# Patient Record
Sex: FEMALE | Race: BLACK OR AFRICAN AMERICAN | NOT HISPANIC OR LATINO | Employment: FULL TIME | URBAN - METROPOLITAN AREA
[De-identification: names, ages, dates, MRNs, and addresses within clinical notes are randomized per-mention and may not be internally consistent; named-entity substitution may affect disease eponyms.]

---

## 2023-10-06 DIAGNOSIS — B20 HUMAN IMMUNODEFICIENCY VIRUS (MULTI): Primary | ICD-10-CM

## 2023-10-06 RX ORDER — BICTEGRAVIR SODIUM, EMTRICITABINE, AND TENOFOVIR ALAFENAMIDE FUMARATE 50; 200; 25 MG/1; MG/1; MG/1
1 TABLET ORAL DAILY
Qty: 30 TABLET | Refills: 0 | Status: SHIPPED | OUTPATIENT
Start: 2023-10-06 | End: 2023-10-30 | Stop reason: SDUPTHER

## 2023-10-09 ENCOUNTER — TELEPHONE (OUTPATIENT)
Dept: IMMUNOLOGY | Facility: CLINIC | Age: 28
End: 2023-10-09
Payer: COMMERCIAL

## 2023-10-09 NOTE — TELEPHONE ENCOUNTER
Patient called for appointments with Dr. Ann and Dr. Lindquist.  RN was able to schedule patient with  Dr. Finn in November.  Explained to patient that Dr. Ann no longer works at the [facility].  Dr. Finn is our new OB/GYN who sees patients on Wednesday mornings.  Patient seemed ok with that.  Also told her that Dr. Lindquist's nurse will give her a call when she is back in the office to schedule her appointment with Dr. Lindquist.

## 2023-10-10 ENCOUNTER — APPOINTMENT (OUTPATIENT)
Dept: PRIMARY CARE | Facility: CLINIC | Age: 28
End: 2023-10-10
Payer: COMMERCIAL

## 2023-10-16 ENCOUNTER — TELEPHONE (OUTPATIENT)
Dept: IMMUNOLOGY | Facility: CLINIC | Age: 28
End: 2023-10-16
Payer: COMMERCIAL

## 2023-10-16 ENCOUNTER — DOCUMENTATION (OUTPATIENT)
Dept: IMMUNOLOGY | Facility: CLINIC | Age: 28
End: 2023-10-16
Payer: COMMERCIAL

## 2023-10-16 NOTE — TELEPHONE ENCOUNTER
RN advises that Pt having some concerns, was agreeable to a check in from this Sw. Kasey calls and leaves message for Pt introducing themself. Pt scheduled to see Dr. Lindquist on 10/27, and Kasey lets Pt know in their message that they will plan to meet with Pt at that visit. Kasey leaves their contact information if Pt would like to reach out to them before appt. Sw will follow. Kasey advises RN of same.

## 2023-10-16 NOTE — PROGRESS NOTES
Pt called back today after I left her a message regarding scheduling a visit with Dr. Lindquist.  Pt stated she was not doing well and was feeling overwhelmed with life in general.  She is also having a hard time getting off work to make her appointments and was asking about an FMLA being completed.  Visit scheduled for 10/27/23 with Dr. Lindquist and JANIYA Banuelos to reach out to pt this afternoon.  Dr. Lindquist made aware of situation.

## 2023-10-26 RX ORDER — HYDROXYZINE HYDROCHLORIDE 25 MG/1
25 TABLET, FILM COATED ORAL AS NEEDED
COMMUNITY
Start: 2023-04-14 | End: 2023-10-27 | Stop reason: ALTCHOICE

## 2023-10-26 RX ORDER — BUPROPION HYDROCHLORIDE 300 MG/1
300 TABLET ORAL DAILY
COMMUNITY
End: 2023-11-10 | Stop reason: SDUPTHER

## 2023-10-26 RX ORDER — IBUPROFEN 800 MG/1
TABLET ORAL
COMMUNITY

## 2023-10-26 RX ORDER — DROSPIRENONE 4 MG/1
1 TABLET, FILM COATED ORAL DAILY
COMMUNITY
End: 2023-11-15 | Stop reason: SDUPTHER

## 2023-10-26 RX ORDER — BISMUTH SUBSALICYLATE 262 MG
TABLET,CHEWABLE ORAL
COMMUNITY
Start: 2021-11-04 | End: 2023-10-27 | Stop reason: ALTCHOICE

## 2023-10-27 ENCOUNTER — OFFICE VISIT (OUTPATIENT)
Dept: IMMUNOLOGY | Facility: CLINIC | Age: 28
End: 2023-10-27
Payer: COMMERCIAL

## 2023-10-27 ENCOUNTER — DOCUMENTATION (OUTPATIENT)
Dept: IMMUNOLOGY | Facility: CLINIC | Age: 28
End: 2023-10-27

## 2023-10-27 VITALS
SYSTOLIC BLOOD PRESSURE: 127 MMHG | BODY MASS INDEX: 31.83 KG/M2 | WEIGHT: 210 LBS | OXYGEN SATURATION: 97 % | HEART RATE: 97 BPM | RESPIRATION RATE: 16 BRPM | HEIGHT: 68 IN | DIASTOLIC BLOOD PRESSURE: 87 MMHG | TEMPERATURE: 97 F

## 2023-10-27 DIAGNOSIS — B20 CURRENTLY ASYMPTOMATIC HIV INFECTION, WITH HISTORY OF HIV-RELATED ILLNESS (MULTI): ICD-10-CM

## 2023-10-27 DIAGNOSIS — F41.9 ANXIETY: ICD-10-CM

## 2023-10-27 DIAGNOSIS — F33.1 MODERATE EPISODE OF RECURRENT MAJOR DEPRESSIVE DISORDER (MULTI): Primary | ICD-10-CM

## 2023-10-27 DIAGNOSIS — Z02.89 ENCOUNTER FOR COMPLETION OF FORM WITH PATIENT: ICD-10-CM

## 2023-10-27 DIAGNOSIS — J45.909 ASTHMA, UNSPECIFIED ASTHMA SEVERITY, UNSPECIFIED WHETHER COMPLICATED, UNSPECIFIED WHETHER PERSISTENT (HHS-HCC): ICD-10-CM

## 2023-10-27 PROBLEM — A53.9 SYPHILIS: Status: RESOLVED | Noted: 2023-10-27 | Resolved: 2023-10-27

## 2023-10-27 PROBLEM — Z91.410 HISTORY OF DOMESTIC PHYSICAL ABUSE IN ADULT: Status: ACTIVE | Noted: 2023-10-27

## 2023-10-27 PROBLEM — O03.9 ABORTION (HHS-HCC): Status: ACTIVE | Noted: 2023-10-27

## 2023-10-27 PROBLEM — Z79.899 HIGH RISK MEDICATION USE: Status: ACTIVE | Noted: 2023-10-27

## 2023-10-27 PROBLEM — Z21 HIV INFECTION: Status: ACTIVE | Noted: 2023-10-27

## 2023-10-27 PROBLEM — F32.9 MAJOR DEPRESSION: Status: ACTIVE | Noted: 2023-10-27

## 2023-10-27 PROBLEM — A59.9 TRICHOMONAS INFECTION: Status: ACTIVE | Noted: 2023-10-27

## 2023-10-27 PROBLEM — O03.9 ABORTION (HHS-HCC): Status: RESOLVED | Noted: 2023-10-27 | Resolved: 2023-10-27

## 2023-10-27 PROBLEM — N92.1 BREAKTHROUGH BLEEDING ON BIRTH CONTROL PILLS: Status: ACTIVE | Noted: 2023-10-27

## 2023-10-27 PROBLEM — B59: Status: ACTIVE | Noted: 2023-10-27

## 2023-10-27 PROBLEM — R00.0 TACHYCARDIA: Status: ACTIVE | Noted: 2023-10-27

## 2023-10-27 PROBLEM — R30.0 DYSURIA: Status: RESOLVED | Noted: 2023-10-27 | Resolved: 2023-10-27

## 2023-10-27 PROBLEM — A53.9 SYPHILIS: Status: ACTIVE | Noted: 2023-10-27

## 2023-10-27 PROBLEM — R30.0 DYSURIA: Status: ACTIVE | Noted: 2023-10-27

## 2023-10-27 PROBLEM — I31.39 PERICARDIAL EFFUSION (HHS-HCC): Status: ACTIVE | Noted: 2023-10-27

## 2023-10-27 PROBLEM — A59.9 TRICHOMONAS INFECTION: Status: RESOLVED | Noted: 2023-10-27 | Resolved: 2023-10-27

## 2023-10-27 PROBLEM — F32.A CHRONIC DEPRESSION: Status: ACTIVE | Noted: 2023-10-27

## 2023-10-27 LAB
ALBUMIN SERPL BCP-MCNC: 4.3 G/DL (ref 3.4–5)
ALP SERPL-CCNC: 78 U/L (ref 33–110)
ALT SERPL W P-5'-P-CCNC: 15 U/L (ref 7–45)
ANION GAP SERPL CALC-SCNC: 11 MMOL/L (ref 10–20)
AST SERPL W P-5'-P-CCNC: 18 U/L (ref 9–39)
BASOPHILS # BLD AUTO: 0.07 X10*3/UL (ref 0–0.1)
BASOPHILS NFR BLD AUTO: 1 %
BILIRUB SERPL-MCNC: 0.3 MG/DL (ref 0–1.2)
BUN SERPL-MCNC: 16 MG/DL (ref 6–23)
CALCIUM SERPL-MCNC: 9.1 MG/DL (ref 8.6–10.6)
CHLORIDE SERPL-SCNC: 108 MMOL/L (ref 98–107)
CHOLEST SERPL-MCNC: 171 MG/DL (ref 0–199)
CHOLESTEROL/HDL RATIO: 2.9
CO2 SERPL-SCNC: 31 MMOL/L (ref 21–32)
CREAT SERPL-MCNC: 0.92 MG/DL (ref 0.5–1.05)
EOSINOPHIL # BLD AUTO: 0.22 X10*3/UL (ref 0–0.7)
EOSINOPHIL NFR BLD AUTO: 3.1 %
ERYTHROCYTE [DISTWIDTH] IN BLOOD BY AUTOMATED COUNT: 13.5 % (ref 11.5–14.5)
GFR SERPL CREATININE-BSD FRML MDRD: 87 ML/MIN/1.73M*2
GLUCOSE SERPL-MCNC: 71 MG/DL (ref 74–99)
HCT VFR BLD AUTO: 41.1 % (ref 36–46)
HDLC SERPL-MCNC: 58 MG/DL
HGB BLD-MCNC: 12.8 G/DL (ref 12–16)
IMM GRANULOCYTES # BLD AUTO: 0.02 X10*3/UL (ref 0–0.7)
IMM GRANULOCYTES NFR BLD AUTO: 0.3 % (ref 0–0.9)
LDLC SERPL CALC-MCNC: 97 MG/DL
LYMPHOCYTES # BLD AUTO: 3.26 X10*3/UL (ref 1.2–4.8)
LYMPHOCYTES NFR BLD AUTO: 45.7 %
MCH RBC QN AUTO: 27.9 PG (ref 26–34)
MCHC RBC AUTO-ENTMCNC: 31.1 G/DL (ref 32–36)
MCV RBC AUTO: 90 FL (ref 80–100)
MONOCYTES # BLD AUTO: 0.95 X10*3/UL (ref 0.1–1)
MONOCYTES NFR BLD AUTO: 13.3 %
NEUTROPHILS # BLD AUTO: 2.62 X10*3/UL (ref 1.2–7.7)
NEUTROPHILS NFR BLD AUTO: 36.6 %
NON HDL CHOLESTEROL: 113 MG/DL (ref 0–149)
NRBC BLD-RTO: 0 /100 WBCS (ref 0–0)
PLATELET # BLD AUTO: 257 X10*3/UL (ref 150–450)
PMV BLD AUTO: 10.3 FL (ref 7.5–11.5)
POTASSIUM SERPL-SCNC: 3.9 MMOL/L (ref 3.5–5.3)
PROT SERPL-MCNC: 6.7 G/DL (ref 6.4–8.2)
RBC # BLD AUTO: 4.58 X10*6/UL (ref 4–5.2)
SODIUM SERPL-SCNC: 146 MMOL/L (ref 136–145)
TRIGL SERPL-MCNC: 79 MG/DL (ref 0–149)
TSH SERPL-ACNC: 1.05 MIU/L (ref 0.44–3.98)
VLDL: 16 MG/DL (ref 0–40)
WBC # BLD AUTO: 7.1 X10*3/UL (ref 4.4–11.3)

## 2023-10-27 PROCEDURE — 85025 COMPLETE CBC W/AUTO DIFF WBC: CPT | Performed by: FAMILY MEDICINE

## 2023-10-27 PROCEDURE — 99214 OFFICE O/P EST MOD 30 MIN: CPT | Performed by: FAMILY MEDICINE

## 2023-10-27 PROCEDURE — 88185 FLOWCYTOMETRY/TC ADD-ON: CPT | Mod: TC | Performed by: FAMILY MEDICINE

## 2023-10-27 PROCEDURE — 99214 OFFICE O/P EST MOD 30 MIN: CPT | Mod: GC | Performed by: FAMILY MEDICINE

## 2023-10-27 PROCEDURE — 86318 IA INFECTIOUS AGENT ANTIBODY: CPT | Performed by: FAMILY MEDICINE

## 2023-10-27 PROCEDURE — 84443 ASSAY THYROID STIM HORMONE: CPT | Performed by: FAMILY MEDICINE

## 2023-10-27 PROCEDURE — 80061 LIPID PANEL: CPT | Performed by: FAMILY MEDICINE

## 2023-10-27 PROCEDURE — 36415 COLL VENOUS BLD VENIPUNCTURE: CPT | Performed by: FAMILY MEDICINE

## 2023-10-27 PROCEDURE — 1036F TOBACCO NON-USER: CPT | Performed by: FAMILY MEDICINE

## 2023-10-27 PROCEDURE — 87536 HIV-1 QUANT&REVRSE TRNSCRPJ: CPT | Performed by: FAMILY MEDICINE

## 2023-10-27 PROCEDURE — 80053 COMPREHEN METABOLIC PANEL: CPT | Performed by: FAMILY MEDICINE

## 2023-10-27 RX ORDER — METOPROLOL TARTRATE 25 MG/1
25 TABLET, FILM COATED ORAL 2 TIMES DAILY
COMMUNITY
Start: 2021-10-29 | End: 2023-10-27 | Stop reason: ALTCHOICE

## 2023-10-27 RX ORDER — CHOLECALCIFEROL (VITAMIN D3) 50 MCG
2.5 TABLET ORAL DAILY
COMMUNITY
Start: 2021-10-05 | End: 2023-10-27 | Stop reason: ALTCHOICE

## 2023-10-27 RX ORDER — BUTALBITAL, ACETAMINOPHEN AND CAFFEINE 50; 325; 40 MG/1; MG/1; MG/1
1 TABLET ORAL EVERY 4 HOURS PRN
COMMUNITY
Start: 2021-10-29 | End: 2023-10-27 | Stop reason: ALTCHOICE

## 2023-10-27 RX ORDER — IPRATROPIUM BROMIDE 0.5 MG/2.5ML
2.5 SOLUTION RESPIRATORY (INHALATION) EVERY 6 HOURS
COMMUNITY
Start: 2021-10-08 | End: 2023-10-27 | Stop reason: ALTCHOICE

## 2023-10-27 RX ORDER — ESCITALOPRAM OXALATE 10 MG/1
10 TABLET ORAL DAILY
Qty: 30 TABLET | Refills: 2 | Status: SHIPPED | OUTPATIENT
Start: 2023-10-27 | End: 2023-11-10 | Stop reason: WASHOUT

## 2023-10-27 RX ORDER — BUSPIRONE HYDROCHLORIDE 5 MG/1
5 TABLET ORAL EVERY 8 HOURS
COMMUNITY
Start: 2021-10-29 | End: 2023-10-27 | Stop reason: ALTCHOICE

## 2023-10-27 RX ORDER — ALBUTEROL SULFATE 0.83 MG/ML
3 SOLUTION RESPIRATORY (INHALATION) 3 TIMES DAILY PRN
COMMUNITY
Start: 2021-09-19 | End: 2023-10-27 | Stop reason: ALTCHOICE

## 2023-10-27 RX ORDER — MIDODRINE HYDROCHLORIDE 5 MG/1
5 TABLET ORAL 3 TIMES DAILY
COMMUNITY
Start: 2021-10-08 | End: 2023-10-27 | Stop reason: ALTCHOICE

## 2023-10-27 RX ORDER — FLUOXETINE HYDROCHLORIDE 20 MG/1
20 CAPSULE ORAL DAILY
COMMUNITY
Start: 2021-10-29 | End: 2023-10-27 | Stop reason: ALTCHOICE

## 2023-10-27 RX ORDER — ALBUTEROL SULFATE 90 UG/1
2 AEROSOL, METERED RESPIRATORY (INHALATION) EVERY 6 HOURS PRN
Qty: 18 G | Refills: 11 | Status: SHIPPED | OUTPATIENT
Start: 2023-10-27 | End: 2024-10-26

## 2023-10-27 RX ORDER — TEMAZEPAM 15 MG/1
15 CAPSULE ORAL NIGHTLY PRN
COMMUNITY
Start: 2022-10-28 | End: 2023-10-27 | Stop reason: ALTCHOICE

## 2023-10-27 RX ORDER — ALPRAZOLAM 1 MG/1
1 TABLET ORAL EVERY 4 HOURS PRN
COMMUNITY
Start: 2021-10-29 | End: 2023-10-27 | Stop reason: ALTCHOICE

## 2023-10-27 RX ORDER — ACETAMINOPHEN 325 MG/1
650 TABLET ORAL EVERY 4 HOURS PRN
COMMUNITY
Start: 2021-10-29

## 2023-10-27 RX ORDER — PREDNISONE 20 MG/1
20 TABLET ORAL EVERY 24 HOURS
COMMUNITY
Start: 2021-10-29 | End: 2023-10-27 | Stop reason: ALTCHOICE

## 2023-10-27 ASSESSMENT — PATIENT HEALTH QUESTIONNAIRE - PHQ9
3. TROUBLE FALLING OR STAYING ASLEEP: NEARLY EVERY DAY
SUM OF ALL RESPONSES TO PHQ QUESTIONS 1-9: 22
10. IF YOU CHECKED OFF ANY PROBLEMS, HOW DIFFICULT HAVE THESE PROBLEMS MADE IT FOR YOU TO DO YOUR WORK, TAKE CARE OF THINGS AT HOME, OR GET ALONG WITH OTHER PEOPLE: VERY DIFFICULT
SUM OF ALL RESPONSES TO PHQ9 QUESTIONS 1 & 2: 5
7. TROUBLE CONCENTRATING ON THINGS, SUCH AS READING THE NEWSPAPER OR WATCHING TELEVISION: SEVERAL DAYS
8. MOVING OR SPEAKING SO SLOWLY THAT OTHER PEOPLE COULD HAVE NOTICED. OR THE OPPOSITE, BEING SO FIGETY OR RESTLESS THAT YOU HAVE BEEN MOVING AROUND A LOT MORE THAN USUAL: SEVERAL DAYS
4. FEELING TIRED OR HAVING LITTLE ENERGY: NEARLY EVERY DAY
1. LITTLE INTEREST OR PLEASURE IN DOING THINGS: MORE THAN HALF THE DAYS
5. POOR APPETITE OR OVEREATING: NEARLY EVERY DAY
6. FEELING BAD ABOUT YOURSELF - OR THAT YOU ARE A FAILURE OR HAVE LET YOURSELF OR YOUR FAMILY DOWN: NEARLY EVERY DAY
9. THOUGHTS THAT YOU WOULD BE BETTER OFF DEAD, OR OF HURTING YOURSELF: NEARLY EVERY DAY
2. FEELING DOWN, DEPRESSED OR HOPELESS: NEARLY EVERY DAY

## 2023-10-27 ASSESSMENT — ANXIETY QUESTIONNAIRES
1. FEELING NERVOUS, ANXIOUS, OR ON EDGE: NEARLY EVERY DAY
GAD7 TOTAL SCORE: 18
2. NOT BEING ABLE TO STOP OR CONTROL WORRYING: NEARLY EVERY DAY
3. WORRYING TOO MUCH ABOUT DIFFERENT THINGS: NEARLY EVERY DAY
5. BEING SO RESTLESS THAT IT IS HARD TO SIT STILL: SEVERAL DAYS
4. TROUBLE RELAXING: NEARLY EVERY DAY
7. FEELING AFRAID AS IF SOMETHING AWFUL MIGHT HAPPEN: MORE THAN HALF THE DAYS
6. BECOMING EASILY ANNOYED OR IRRITABLE: NEARLY EVERY DAY
IF YOU CHECKED OFF ANY PROBLEMS ON THIS QUESTIONNAIRE, HOW DIFFICULT HAVE THESE PROBLEMS MADE IT FOR YOU TO DO YOUR WORK, TAKE CARE OF THINGS AT HOME, OR GET ALONG WITH OTHER PEOPLE: VERY DIFFICULT

## 2023-10-27 ASSESSMENT — PAIN SCALES - GENERAL: PAINLEVEL: 0-NO PAIN

## 2023-10-27 NOTE — ASSESSMENT & PLAN NOTE
Doing well, taking daily meds with minimal missed doses. Updated labs today, as we have none in the past year.

## 2023-10-27 NOTE — PROGRESS NOTES
Sw meets with Pt at MD appt. Pt has PHQ-9 score of 22 today, does endorse some passive suicidality. Sw provides Pt with resource information for the behavioral health urgent care at [facility]. Sw and Pt discuss safety plan - Pt denies active suicidal intent, denies access to weapons in home. MD starting Pt on Lexapro today, and Sw explains that Pt likely to see some therapeutic benefit relatively soon. Pt open to this Sw checking in with her in a few weeks. Pt endorses ART adherence, does have some access concerns as she will lose Medicaid in early 2024, but should be eligible for employer insurance. Sw advises that they are able to assist with insurance enrollment questions, also that [facility] can assist with medication access provided Pt remains financially eligible. Pt to keep Sw updated. Sw to follow.

## 2023-10-27 NOTE — PROGRESS NOTES
I saw and evaluated the patient. I personally obtained the key and critical portions of the history and physical exam or was physically present for key and critical portions performed by the resident. I reviewed the resident's documentation and discussed the patient with the resident. I agree with the resident's medical decision making as documented in the note.    HIV- well controlled, check labs and continue current regimen. Discussed various support group options    Depression and anxiety- discussed at length today and reviewed options. Ultimately we will add on lexapro, plan for counseling and fill out [facility] paperwork to allow for this. She endorses passive SI, no intent or plan. Discussed safety plan.     Plan for close follow-up    Jess Lindquist MD

## 2023-10-27 NOTE — LETTER
10/27/23    Katie Del Real MD  [address]  Pediatrics  [facility] [address]      Dear Dr. Katie Del Real MD,    I am writing to confirm that your patient, Yolis Castillo, received care in my office on 10/27/23. I have enclosed a summary of the care provided to Yolis for your reference.    Please contact me with any questions you may have regarding the visit.    Sincerely,         Jess Lindquist MD  [address]  [address]  [place] [id number]    CC: No Recipients

## 2023-10-27 NOTE — PROGRESS NOTES
"Subjective   Patient ID: Yolis is a 28 y.o. female with PMH of HIV/AIDS, depression, anxiety, and pericardial effusion who presents for HIV Positive/AIDS.    Last seen 4/2023 via telephone visit.    # HIV/AIDS  - Review of history: Diagnosed Sept 2021 when hospitalized for PCP pneumonia  - Taking Biktarvy, tolerating well, no cost issues  - sexually active with 1 partner in past 6 months, not currently sexually active, no STI exposure that she knows of  - on Slynd, no menses while on this  - no labs in past year, amenable to having them done today  - admits to feeling overwhelmed by needing to be on daily medication for the rest of her life; saddened by the daily reminder and admits that \"I don't know if I can do this for the next 30 years\"  - lives in [place], has been in the same place for a while; lives alone, daughter moves between her and her mom's house, admits that daughter has been staying with her mom more often recently because her depression/anxiety has been so overwhelming    # Depression/Anxiety  - feels exhausted all the time, but also unable to sleep due to racing thoughts and feeling panicked when laying in bed; finds it very difficult to get up in the morning, will often lay in bed crying \"for hours\"  - still taking wellbutrin daily (started about a year ago); previously had noticed significant improvement in depression symptoms, no longer feels like it's helping  - had previously tried prozac (no benefit) and zoloft (while pregnant, can't remember effectiveness)  - using marijuana daily to help with anxiety; no cigarettes; socially drinks alcohol; no other recreational drug use  - 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    Current Outpatient Medications on File Prior to Visit   Medication Sig Dispense Refill    yrwwyybfy-cylxwbbl-tinhreu ala (Biktarvy) -25 mg tablet Take 1 tablet by mouth once daily. 30 tablet 0    buPROPion XL (Wellbutrin XL) 300 mg 24 hr tablet Take 1 tablet (300 mg) by mouth once daily. as directed      ibuprofen 800 mg tablet TAKE ONE TABLET THREE TIMES A DAY FOR 5-10 DAYS PER MONTH FOR BREAKTHROUGH BLEEDING.      Slynd 4 mg (28) tablet Take 1 tablet by mouth once daily.      [DISCONTINUED] hydrOXYzine HCL (Atarax) 25 mg tablet Take 1 tablet (25 mg) by mouth if needed for anxiety.      [DISCONTINUED] multivitamin tablet Take by mouth.       No current facility-administered medications on file prior to visit.        Objective   Vitals: /87 (BP Location: Left arm, Patient Position: Sitting, BP Cuff Size: Large adult)   Pulse 97   Temp 36.1 °C (97 °F) (Skin)   Resp 16   Ht 1.727 m (5' 8\")   Wt 95.3 kg (210 lb)   SpO2 97%   BMI 31.93 kg/m²      Physical Exam  Vitals reviewed.   Constitutional:       General: She is not in acute distress.     Appearance: Normal appearance. She is not ill-appearing.   HENT:      Head: Normocephalic and atraumatic.   Eyes:      Extraocular Movements: Extraocular movements intact.      Conjunctiva/sclera: Conjunctivae normal.   Cardiovascular:      Rate and Rhythm: Normal rate and regular rhythm.      Heart sounds: No murmur heard.     No friction rub. No gallop.   Pulmonary:      Effort: Pulmonary effort is normal. No respiratory distress.      Breath sounds: Normal breath sounds. No wheezing, rhonchi or rales.   Abdominal:      General: Abdomen is flat. There is no distension.      Palpations: " Abdomen is soft.      Tenderness: There is no abdominal tenderness. There is no guarding.   Musculoskeletal:         General: No tenderness or signs of injury. Normal range of motion.      Cervical back: Normal range of motion.      Right lower leg: No edema.      Left lower leg: No edema.   Skin:     General: Skin is warm and dry.   Neurological:      General: No focal deficit present.      Mental Status: She is alert and oriented to person, place, and time.      Cranial Nerves: No cranial nerve deficit.      Motor: No weakness.      Gait: Gait normal.   Psychiatric:         Attention and Perception: Attention normal.         Mood and Affect: Mood is anxious and depressed. Affect is tearful.         Speech: Speech normal.         Behavior: Behavior normal.         Thought Content: Thought content includes suicidal (passive only, no active) ideation. Thought content does not include homicidal ideation. Thought content does not include homicidal or suicidal plan.         Cognition and Memory: Cognition and memory normal.       CD3+CD4+ Absolute   Date Value Ref Range Status   09/09/2022 0.346 (L) 0.350 - 2.740 x10E9/L Final   07/26/2022 0.466 0.350 - 2.740 x10E9/L Final   06/24/2022 0.294 (L) 0.350 - 2.740 x10E9/L Final     CD3+CD4+%   Date Value Ref Range Status   09/09/2022 18 (L) 29 - 57 % Final   07/26/2022 16 (L) 29 - 57 % Final   06/24/2022 17 (L) 29 - 57 % Final     HIV-1 RNA Viral Load   Date Value Ref Range Status   10/28/2022 NOT DETECTED COPIES/mL Final     Comment:     REF VALUE  NOT DETECTED     09/09/2022 70 (A) COPIES/mL Final     Comment:     REF VALUE  NOT DETECTED     07/26/2022 <20 (A) COPIES/mL Final     Comment:     REF VALUE  NOT DETECTED  VIRUS DETECTED BUT BELOW THE THRESHOLD OF QUANTIFICATION           PHQ9:  Over the past 2 weeks, how often have you been bothered by any of the following problems?  Little interest or pleasure in doing things: More than half the days  Feeling down, depressed,  or hopeless: Nearly every day  Trouble falling or staying asleep, or sleeping too much: Nearly every day  Feeling tired or having little energy: Nearly every day  Poor appetite or overeating: Nearly every day  Feeling bad about yourself - or that you are a failure or have let yourself or your family down: Nearly every day  Trouble concentrating on things, such as reading the newspaper or watching television: Several days  Moving or speaking so slowly that other people could have noticed? Or the opposite - being so fidgety or restless that you have been moving around a lot more than usual.: Several days  Thoughts that you would be better off dead or hurting yourself in some way: Nearly every day  Patient Health Questionnaire-9 Score: 22  Difficulty: Very difficult     GAD7:  Over the last 2 weeks, how often have you been bothered by any of the following problems?  Feeling nervous, anxious, or on edge: Nearly every day  Not being able to stop or control worrying: Nearly every day  Worrying too much about different things: Nearly every day  Trouble relaxing: Nearly every day  Being so restless that it is hard to sit still: Several days  Becoming easily annoyed or irritable: Nearly every day  Feeling afraid as if something awful might happen: More than half the days  NATI-7 Total Score: 18  Difficulty: Very difficult     Assessment/Plan   Problem List Items Addressed This Visit       Anxiety    Major depression - Primary     Significantly symptomatic. Discussed mechanism of wellbutrin, including activating effect; patient agreeable to add medication today. She's concerned about decreased sex drive with SSRI, but is amenable to try; discussed that continuing wellbutrin can help decrease this side effect, will keep current dose for now. Recommend starting Lexapro; discussed r/b/a, including 4-6 weeks to notice effect, patient agreeable. Does endorse some suicidality, but largely passive and denies any plan or self harm  behaviors, no access to lethal means, and protective factor of daughter; relatively low risk at this time. Gave therapy resources today, which patient plans to utilize. Also discussed women's support group, because a lot of this seems related to depression regarding HIV stigma. Will get TSH to r/o thyroid abnormality as etiology.         Relevant Medications    escitalopram (Lexapro) 10 mg tablet    Other Relevant Orders    TSH with reflex to Free T4 if abnormal    HIV infection (CMS/HCC)     Doing well, taking daily meds with minimal missed doses. Updated labs today, as we have none in the past year.         Relevant Orders    RPR Monitoring    Lipid Panel    HIV RNA, quantitative, PCR    CBC and Auto Differential    Comprehensive Metabolic Panel    CD4/8 Panel    C. Trachomatis / N. Gonorrhoeae, Amplified Detection    TRICH VAGINALIS, AMPLIFIED    Asthma    Relevant Medications    albuterol 90 mcg/actuation inhaler     Other Visit Diagnoses       Encounter for completion of form with patient        FMLA discussed today so that she can leave work for counseling and medical appointments, will complete and return to patient            Patient Attending Supervision: seen and discussed with attending physician (cosigner listed on this note).    RTC in 2 weeks for virtual check in, or earlier as needed.    Stella Smith MD  Family Medicine PGY-3

## 2023-10-27 NOTE — ASSESSMENT & PLAN NOTE
Significantly symptomatic. Discussed mechanism of wellbutrin, including activating effect; patient agreeable to add medication today. She's concerned about decreased sex drive with SSRI, but is amenable to try; discussed that continuing wellbutrin can help decrease this side effect, will keep current dose for now. Recommend starting Lexapro; discussed r/b/a, including 4-6 weeks to notice effect, patient agreeable. Does endorse some suicidality, but largely passive and denies any plan or self harm behaviors, no access to lethal means, and protective factor of daughter; relatively low risk at this time. Gave therapy resources today, which patient plans to utilize. Also discussed women's support group, because a lot of this seems related to depression regarding HIV stigma. Will get TSH to r/o thyroid abnormality as etiology.

## 2023-10-28 LAB
RPR SER QL: REACTIVE
RPR SER-TITR: ABNORMAL {TITER}

## 2023-10-29 LAB
CD3+CD4+ CELLS # BLD: 0.69 X10E9/L
CD3+CD4+ CELLS NFR BLD: 21 %
CD3+CD4+ CELLS/CD3+CD8+ CLL BLD: 0.39 %
CD3+CD8+ CELLS # BLD: 1.76 X10E9/L
CD3+CD8+ CELLS NFR BLD: 54 %
HIV1 RNA # PLAS NAA DL=20: ABNORMAL {COPIES}/ML
HIV1 RNA SPEC NAA+PROBE-LOG#: ABNORMAL {LOG_COPIES}/ML
LYMPHOCYTES # SPEC AUTO: 3.26 X10*3/UL

## 2023-10-30 ENCOUNTER — PATIENT MESSAGE (OUTPATIENT)
Dept: IMMUNOLOGY | Facility: CLINIC | Age: 28
End: 2023-10-30
Payer: COMMERCIAL

## 2023-10-30 DIAGNOSIS — B20 HUMAN IMMUNODEFICIENCY VIRUS (MULTI): ICD-10-CM

## 2023-10-30 RX ORDER — BICTEGRAVIR SODIUM, EMTRICITABINE, AND TENOFOVIR ALAFENAMIDE FUMARATE 50; 200; 25 MG/1; MG/1; MG/1
1 TABLET ORAL DAILY
Qty: 30 TABLET | Refills: 5 | Status: SHIPPED | OUTPATIENT
Start: 2023-10-30 | End: 2024-05-03 | Stop reason: SDUPTHER

## 2023-11-09 ENCOUNTER — OFFICE VISIT (OUTPATIENT)
Dept: PRIMARY CARE | Facility: CLINIC | Age: 28
End: 2023-11-09
Payer: COMMERCIAL

## 2023-11-09 DIAGNOSIS — F33.1 MODERATE EPISODE OF RECURRENT MAJOR DEPRESSIVE DISORDER (MULTI): ICD-10-CM

## 2023-11-09 DIAGNOSIS — J32.9 SINUSITIS, UNSPECIFIED CHRONICITY, UNSPECIFIED LOCATION: ICD-10-CM

## 2023-11-09 DIAGNOSIS — Z00.00 HEALTHCARE MAINTENANCE: ICD-10-CM

## 2023-11-09 DIAGNOSIS — F41.9 ANXIETY: Primary | ICD-10-CM

## 2023-11-09 PROCEDURE — 1036F TOBACCO NON-USER: CPT | Performed by: INTERNAL MEDICINE

## 2023-11-09 PROCEDURE — 99203 OFFICE O/P NEW LOW 30 MIN: CPT | Performed by: INTERNAL MEDICINE

## 2023-11-09 RX ORDER — PROPRANOLOL HYDROCHLORIDE 10 MG/1
10 TABLET ORAL 2 TIMES DAILY
Qty: 60 TABLET | Refills: 5 | Status: SHIPPED | OUTPATIENT
Start: 2023-11-09 | End: 2024-02-19 | Stop reason: ALTCHOICE

## 2023-11-09 RX ORDER — CETIRIZINE HYDROCHLORIDE 10 MG/1
10 TABLET ORAL DAILY
Qty: 30 TABLET | Refills: 2 | Status: SHIPPED | OUTPATIENT
Start: 2023-11-09 | End: 2023-12-04

## 2023-11-09 ASSESSMENT — PAIN SCALES - GENERAL: PAINLEVEL: 0-NO PAIN

## 2023-11-09 NOTE — PROGRESS NOTES
"New patient visit     Yolis Castillo is 28 y.o. a female   who presents for   Chief Complaint   Patient presents with    New Patient Visit     Referred by Dr. Guzman needs to establish healthcare        Problem list   Depression  Anxiety  Asthma  Tachycardia  HIV    HPI   Depression  On wellbutrin for 1 year. Prescribed escitalopram recently, but she is not taking it - does not want to be on more medications. The patient is tearful during the interview. Since her HIV diagnosis and prolonged hospitalization - including ICU stay and profound weight loss- she feels very depressed. No longer interested in hanging with friends, dating or any activities. Previously very outgoing. She feels hopeless. She does have good social support from her mother helping her with her daughter. Previously referred to access clinic, but was unable to get scheduled.     Sinus drainage  Occurs a lot at night. Never tried cetirizine.      ROS: 12 point ROS negative unless as per HPI         Tobacco Use: Low Risk  (11/9/2023)    Patient History     Smoking Tobacco Use: Never     Smokeless Tobacco Use: Never     Passive Exposure: Not on file      Allergies   Allergen Reactions    Shellfish Containing Products Anaphylaxis      Physical Exam     Visit Vitals  /73   Pulse 64   Temp 36.8 °C (98.3 °F)   Ht 1.727 m (5' 8\")   Wt 46.7 kg (103 lb)   BMI 15.66 kg/m²   Smoking Status Never   BSA 1.5 m²      Appears well, no distress  Breathing comfortably   No LE edema  Cranial nerves grossly intact     Medications     Current Outpatient Medications   Medication Instructions    acetaminophen (TYLENOL) 650 mg, oral, Every 4 hours PRN    albuterol 90 mcg/actuation inhaler 2 puffs, inhalation, Every 6 hours PRN    tqoggbatl-efsnakho-joypofu ala (Biktarvy) -25 mg tablet 1 tablet, oral, Daily    buPROPion XL (WELLBUTRIN XL) 300 mg, oral, Daily, as directed    cetirizine (ZYRTEC) 10 mg, oral, Daily    escitalopram (LEXAPRO) 5 mg, oral, Daily    " ibuprofen 800 mg tablet TAKE ONE TABLET THREE TIMES A DAY FOR 5-10 DAYS PER MONTH FOR BREAKTHROUGH BLEEDING.    propranolol (INDERAL) 10 mg, oral, 2 times daily    Slynd 4 mg (28) tablet 1 tablet, oral, Daily        Recent Labs     Lab Results   Component Value Date    CHOL 171 10/27/2023    LDLF 88 01/24/2019    HDL 58.0 10/27/2023    AST 18 10/27/2023    ALT 15 10/27/2023    BILITOT 0.3 10/27/2023        Lab Results   Component Value Date    WBC 7.1 10/27/2023    HGB 12.8 10/27/2023    HCT 41.1 10/27/2023    MCV 90 10/27/2023     10/27/2023          Chemistry    Lab Results   Component Value Date/Time     (H) 10/27/2023 1018    K 3.9 10/27/2023 1018     (H) 10/27/2023 1018    CO2 31 10/27/2023 1018    BUN 16 10/27/2023 1018    CREATININE 0.92 10/27/2023 1018    Lab Results   Component Value Date/Time    CALCIUM 9.1 10/27/2023 1018    ALKPHOS 78 10/27/2023 1018    AST 18 10/27/2023 1018    ALT 15 10/27/2023 1018    BILITOT 0.3 10/27/2023 1018             Assessment/Plan      Problem List Items Addressed This Visit       Anxiety - Primary Depression    START propranolol (Inderal) 10 mg tablet bid, prn   Continue wellbutrin 300mg   [facility]      Sinusitis, unspecified chronicity, unspecified location        START cetirizine (ZyrTEC) 10 mg tablet      HIV        Following with [facility]            RTC 3 months    Samara Miranda MD MPH  I am the attending physician.

## 2023-11-10 ENCOUNTER — TELEMEDICINE (OUTPATIENT)
Dept: IMMUNOLOGY | Facility: CLINIC | Age: 28
End: 2023-11-10
Payer: COMMERCIAL

## 2023-11-10 DIAGNOSIS — B20 CURRENTLY ASYMPTOMATIC HIV INFECTION, WITH HISTORY OF HIV-RELATED ILLNESS (MULTI): ICD-10-CM

## 2023-11-10 DIAGNOSIS — F41.9 ANXIETY: ICD-10-CM

## 2023-11-10 DIAGNOSIS — F33.1 MODERATE EPISODE OF RECURRENT MAJOR DEPRESSIVE DISORDER (MULTI): Primary | ICD-10-CM

## 2023-11-10 PROCEDURE — 99213 OFFICE O/P EST LOW 20 MIN: CPT | Performed by: FAMILY MEDICINE

## 2023-11-10 RX ORDER — BUPROPION HYDROCHLORIDE 300 MG/1
300 TABLET ORAL DAILY
Qty: 30 TABLET | Refills: 5 | Status: SHIPPED | OUTPATIENT
Start: 2023-11-10

## 2023-11-10 RX ORDER — ESCITALOPRAM OXALATE 5 MG/1
5 TABLET ORAL DAILY
Qty: 30 TABLET | Refills: 5 | Status: SHIPPED | OUTPATIENT
Start: 2023-11-10 | End: 2023-11-21 | Stop reason: SDUPTHER

## 2023-11-10 NOTE — PROGRESS NOTES
Subjective   Patient ID: Yolis Castillo is a 28 y.o. who presents for virtual visit to follow-up on depression. Transitioned to phone visit.   HPI    HIV- well controlled, see last visit for further information  - Gave resources for support groups last visit. She wants to do this and plans to get involved once [place] approved    Depression and anxiety  - See last visit for further information. We discussed counseling and starting lexapro in addition to her wellbutrin. She did not start the lexapro for fear of side effects  - Saw PCP yesterday and was recommended to try propranolol as well   - No significant changes in mood. Although does note that crying has decreased and SI is slightly improved      Review of Systems  10 point review of systems negative except as noted in HPI.    Objective     There were no vitals taken for this visit.  No acute distress. Speaking in full sentences without respiratory distress. Remainder of exam deferred      Assessment/Plan   27 yo F seen via phone visit for follow-up    Depression and anxiety  - Discussed options. Will trial a lower dose of lexapro  - Has referral to behavioral health access clinic and plans to schedule    HIV  - Well controlled, continue current regimen    RTC 4-5 months or sooner as needed

## 2023-11-15 ENCOUNTER — OFFICE VISIT (OUTPATIENT)
Dept: OBSTETRICS AND GYNECOLOGY | Facility: CLINIC | Age: 28
End: 2023-11-15
Payer: COMMERCIAL

## 2023-11-15 VITALS
RESPIRATION RATE: 16 BRPM | HEART RATE: 97 BPM | DIASTOLIC BLOOD PRESSURE: 85 MMHG | SYSTOLIC BLOOD PRESSURE: 129 MMHG | BODY MASS INDEX: 34.86 KG/M2 | OXYGEN SATURATION: 98 % | TEMPERATURE: 98.1 F | HEIGHT: 68 IN | WEIGHT: 230 LBS

## 2023-11-15 DIAGNOSIS — Z30.41 ENCOUNTER FOR SURVEILLANCE OF CONTRACEPTIVE PILLS: ICD-10-CM

## 2023-11-15 DIAGNOSIS — Z01.419 ENCOUNTER FOR GYNECOLOGICAL EXAMINATION WITHOUT ABNORMAL FINDING: Primary | ICD-10-CM

## 2023-11-15 DIAGNOSIS — Z12.4 SCREENING FOR MALIGNANT NEOPLASM OF CERVIX: ICD-10-CM

## 2023-11-15 LAB — T VAGINALIS RRNA SPEC QL NAA+PROBE: NEGATIVE

## 2023-11-15 PROCEDURE — 87661 TRICHOMONAS VAGINALIS AMPLIF: CPT | Performed by: OBSTETRICS & GYNECOLOGY

## 2023-11-15 PROCEDURE — 87800 DETECT AGNT MULT DNA DIREC: CPT | Performed by: OBSTETRICS & GYNECOLOGY

## 2023-11-15 PROCEDURE — 99395 PREV VISIT EST AGE 18-39: CPT | Performed by: OBSTETRICS & GYNECOLOGY

## 2023-11-15 PROCEDURE — 88175 CYTOPATH C/V AUTO FLUID REDO: CPT | Mod: TC,GCY | Performed by: OBSTETRICS & GYNECOLOGY

## 2023-11-15 PROCEDURE — 87661 TRICHOMONAS VAGINALIS AMPLIF: CPT | Performed by: FAMILY MEDICINE

## 2023-11-15 PROCEDURE — 1036F TOBACCO NON-USER: CPT | Performed by: OBSTETRICS & GYNECOLOGY

## 2023-11-15 PROCEDURE — 88141 CYTOPATH C/V INTERPRET: CPT | Performed by: PATHOLOGY

## 2023-11-15 RX ORDER — DROSPIRENONE 4 MG/1
1 TABLET, FILM COATED ORAL DAILY
Qty: 28 TABLET | Refills: 12 | Status: SHIPPED | OUTPATIENT
Start: 2023-11-15 | End: 2024-11-14

## 2023-11-15 ASSESSMENT — PAIN SCALES - GENERAL: PAINLEVEL: 0-NO PAIN

## 2023-11-15 NOTE — PROGRESS NOTES
Subjective   Patient ID: Yolis Castillo is a 28 y.o. female who presents for No chief complaint on file..  Annual gyn visit.   On Slynd for contraception- doing ok with it. Some mood changes, but tolerable. Occasionally misses pills.  Pap last year WNL, needs repeat.   Ok with STI testing off the Pap.   No menses on the pills.         Review of Systems   All other systems reviewed and are negative.      Objective   Physical Exam  Vitals reviewed.   Constitutional:       Appearance: Normal appearance.   HENT:      Head: Normocephalic and atraumatic.      Right Ear: External ear normal.      Left Ear: External ear normal.      Nose: Nose normal.      Mouth/Throat:      Mouth: Mucous membranes are moist.   Eyes:      Extraocular Movements: Extraocular movements intact.   Neck:      Thyroid: No thyroid mass or thyromegaly.   Cardiovascular:      Rate and Rhythm: Normal rate and regular rhythm.      Heart sounds: Normal heart sounds.   Pulmonary:      Effort: Pulmonary effort is normal.      Breath sounds: Normal breath sounds.   Chest:   Breasts:     Right: Normal.      Left: Normal.   Abdominal:      General: Abdomen is flat. Bowel sounds are normal.      Palpations: Abdomen is soft.      Tenderness: There is no abdominal tenderness. There is no right CVA tenderness or left CVA tenderness.   Genitourinary:     General: Normal vulva.      Exam position: Lithotomy position.      Labia:         Right: No lesion.         Left: No lesion.       Urethra: No prolapse, urethral swelling or urethral lesion.      Vagina: Normal.      Cervix: Normal.      Uterus: Normal.       Adnexa: Right adnexa normal and left adnexa normal.   Musculoskeletal:         General: Normal range of motion.      Right shoulder: Normal.      Left shoulder: Normal.      Cervical back: Normal, normal range of motion and neck supple.      Thoracic back: Normal.      Lumbar back: Normal.      Right hip: Normal.      Left hip: Normal.      Right upper  leg: Normal.      Left upper leg: Normal.      Right knee: Normal.      Left knee: Normal.      Right lower leg: Normal.      Left lower leg: Normal.   Lymphadenopathy:      Upper Body:      Right upper body: No axillary adenopathy.      Left upper body: No axillary adenopathy.      Lower Body: No right inguinal adenopathy. No left inguinal adenopathy.   Skin:     General: Skin is warm and dry.   Neurological:      General: No focal deficit present.      Mental Status: She is alert and oriented to person, place, and time.      Cranial Nerves: Cranial nerves 2-12 are intact.      Motor: Motor function is intact.   Psychiatric:         Attention and Perception: Attention and perception normal.         Mood and Affect: Mood and affect normal.         Behavior: Behavior normal.         Cognition and Memory: Cognition normal.         Judgment: Judgment normal.         Assessment/Plan   Problem List Items Addressed This Visit    None  Visit Diagnoses         Codes    Encounter for gynecological examination without abnormal finding    -  Primary Z01.419    Encounter for surveillance of contraceptive pills     Z30.41    Relevant Medications    Slynd 4 mg (28) tablet    Screening for malignant neoplasm of cervix     Z12.4    Relevant Orders    THINPREP PAP TEST

## 2023-11-16 LAB — T VAGINALIS RRNA SPEC QL NAA+PROBE: NEGATIVE

## 2023-11-17 DIAGNOSIS — A74.9 CHLAMYDIA: Primary | ICD-10-CM

## 2023-11-17 LAB
C TRACH RRNA SPEC QL NAA+PROBE: POSITIVE
N GONORRHOEA DNA SPEC QL PROBE+SIG AMP: NEGATIVE

## 2023-11-17 RX ORDER — DOXYCYCLINE 100 MG/1
100 CAPSULE ORAL 2 TIMES DAILY
Qty: 14 CAPSULE | Refills: 0 | Status: SHIPPED | OUTPATIENT
Start: 2023-11-17 | End: 2023-11-24

## 2023-11-20 ENCOUNTER — TELEPHONE (OUTPATIENT)
Dept: IMMUNOLOGY | Facility: CLINIC | Age: 28
End: 2023-11-20
Payer: COMMERCIAL

## 2023-11-20 NOTE — TELEPHONE ENCOUNTER
RN called patient regarding result.  Patient told RN that she picked up the flagyl prescription on Friday.  Med is making her nauseated.  Threw up med this am.  RN suggested that she try eating something before taking the medication.  Encouraged patient to call RN with any questions or concerns.

## 2023-11-21 ENCOUNTER — OFFICE VISIT (OUTPATIENT)
Dept: BEHAVIORAL HEALTH | Facility: CLINIC | Age: 28
End: 2023-11-21
Payer: COMMERCIAL

## 2023-11-21 DIAGNOSIS — F33.1 MODERATE EPISODE OF RECURRENT MAJOR DEPRESSIVE DISORDER (MULTI): ICD-10-CM

## 2023-11-21 DIAGNOSIS — F51.5 NIGHTMARES: ICD-10-CM

## 2023-11-21 DIAGNOSIS — F41.9 ANXIETY: ICD-10-CM

## 2023-11-21 PROCEDURE — 90792 PSYCH DIAG EVAL W/MED SRVCS: CPT | Performed by: STUDENT IN AN ORGANIZED HEALTH CARE EDUCATION/TRAINING PROGRAM

## 2023-11-21 PROCEDURE — 1036F TOBACCO NON-USER: CPT | Performed by: STUDENT IN AN ORGANIZED HEALTH CARE EDUCATION/TRAINING PROGRAM

## 2023-11-21 RX ORDER — TOPIRAMATE 25 MG/1
25 TABLET ORAL NIGHTLY
Qty: 30 TABLET | Refills: 3 | Status: SHIPPED | OUTPATIENT
Start: 2023-11-21 | End: 2024-02-19 | Stop reason: ALTCHOICE

## 2023-11-21 RX ORDER — ESCITALOPRAM OXALATE 5 MG/1
20 TABLET ORAL DAILY
Qty: 30 TABLET | Refills: 3 | Status: SHIPPED | OUTPATIENT
Start: 2023-11-21 | End: 2024-04-17 | Stop reason: DRUGHIGH

## 2023-11-21 RX ORDER — HYDROXYZINE HYDROCHLORIDE 10 MG/1
10 TABLET, FILM COATED ORAL EVERY 6 HOURS PRN
Qty: 30 TABLET | Refills: 3 | Status: SHIPPED | OUTPATIENT
Start: 2023-11-21 | End: 2024-02-19 | Stop reason: SDUPTHER
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
## 2023-11-21 NOTE — PATIENT INSTRUCTIONS
You were seen in the Psychiatry clinic for depression and anxiety.     We think your symptoms are not well controlled and are quite debilitating, we would like to make some changes to your medications:    - Increase Lexapro to 20mg daily  - Start Topiramate 25mg at night to help with nightmares and sleep, after 1 week you can increase this to 50mg at night  - Stop Propranolol  - Start taking Hydroxyzine 10mg as needed when you're having uncontrolled anxiety/panic attacks  - We encourage you to schedule a visit with your therapist about these ongoing issues.    We will schedule follow-up in 4 week on December 19th.

## 2023-11-29 LAB
CYTOLOGY CMNT CVX/VAG CYTO-IMP: NORMAL
LAB AP CONTRACEPTIVE HISTORY: NORMAL
LAB AP HPV GENOTYPE QUESTION: NO
LAB AP HPV HR: NORMAL
LAB AP PAP ADDITIONAL TESTS: NORMAL
LABORATORY COMMENT REPORT: NORMAL
LMP START DATE: NORMAL
PATH REPORT.TOTAL CANCER: NORMAL

## 2023-12-04 DIAGNOSIS — J32.9 SINUSITIS, UNSPECIFIED CHRONICITY, UNSPECIFIED LOCATION: ICD-10-CM

## 2023-12-04 RX ORDER — CETIRIZINE HYDROCHLORIDE 10 MG/1
10 TABLET ORAL DAILY
Qty: 90 TABLET | Refills: 1 | Status: SHIPPED | OUTPATIENT
Start: 2023-12-04

## 2023-12-19 ENCOUNTER — APPOINTMENT (OUTPATIENT)
Dept: BEHAVIORAL HEALTH | Facility: CLINIC | Age: 28
End: 2023-12-19
Payer: COMMERCIAL

## 2023-12-25 NOTE — PROGRESS NOTES
Outpatient Psychiatry    Subjective   Yolis Castillo, a 28 y.o. female with PPH of MDD and anxiety, and PMH HIV presenting to [facility] for follow-up visit.  Patient is referred by Samara Miranda MD MPH .      HPI:  Per chart:  Patient was seen by [facility] on 11/21/23 for initial evaluation. Per review of documentation, it appears that patient's hospitalization in November 2021 for severe pneumonia (found to be HIV pos) has been a major triggering factor for her anxiety and depressive symptoms. Regarding medications, patient has trialed Wellbutrin and Propranolol with no benefit. She had subsequently been initiated on Lexapro. Recommendation at appointment on 11/21 was to increase Lexapro to 20mg PO daily and to start Topamax 25mg PO nightly to help with nightmares and sleep (with planto incrase to 50mg at night. Recommended to discontinue propranolol, and to start taking Atarax 10mg PRN anxiety attacks.    Upon assessment:      Psychiatric Review Of Systems:  Depressive Symptoms: {Depression:77581568}  Manic Symptoms: {Amy:52600775}  Anxiety Symptoms: {Anxiety:93333633}  Psychotic Symptoms: {Psychosis:71752251}  Other Symptoms:    Current Medications:    Current Outpatient Medications:     acetaminophen (Tylenol) 325 mg tablet, Take 2 tablets (650 mg) by mouth every 4 hours if needed for headaches., Disp: , Rfl:     albuterol 90 mcg/actuation inhaler, Inhale 2 puffs every 6 hours if needed for wheezing., Disp: 18 g, Rfl: 11    ouucoxleh-pcpgrxhq-tayhsrk ala (Biktarvy) -25 mg tablet, Take 1 tablet by mouth once daily., Disp: 30 tablet, Rfl: 5    buPROPion XL (Wellbutrin XL) 300 mg 24 hr tablet, Take 1 tablet (300 mg) by mouth once daily. as directed, Disp: 30 tablet, Rfl: 5    cetirizine (ZyrTEC) 10 mg tablet, TAKE 1 TABLET BY MOUTH EVERY DAY, Disp: 90 tablet, Rfl: 1    escitalopram (Lexapro) 5 mg tablet, Take 4 tablets (20 mg) by mouth once daily., Disp: 30 tablet, Rfl: 3    hydrOXYzine HCL  (Atarax) 10 mg tablet, Take 1 tablet (10 mg) by mouth every 6 hours if needed for itching., Disp: 30 tablet, Rfl: 3    ibuprofen 800 mg tablet, TAKE ONE TABLET THREE TIMES A DAY FOR 5-10 DAYS PER MONTH FOR BREAKTHROUGH BLEEDING., Disp: , Rfl:     propranolol (Inderal) 10 mg tablet, Take 1 tablet (10 mg) by mouth 2 times a day., Disp: 60 tablet, Rfl: 5    Slynd 4 mg (28) tablet, Take 1 tablet by mouth once daily., Disp: 28 tablet, Rfl: 12    topiramate (Topamax) 25 mg tablet, Take 1 tablet (25 mg) by mouth once daily at bedtime. Take 1 tablet at night for 1 week, if tolerating can then increase to 2 tablets at night, Disp: 30 tablet, Rfl: 3    Medical History:  Past Medical History:   Diagnosis Date     10/27/2023    AIDS (CMS/[facility]) 10/27/2023    Syphilis 10/27/2023    Trichomonas infection 10/27/2023       Record Review: moderate     Mental Status Exam  Appearance: ***  Attitude: Calm, cooperative, and engaged in conversation.  Behavior: Appropriate eye contact.   Motor Activity: No psychomotor agitation or retardation. No abnormal movements, tremors or tics. No evidence of extrapyramidal symptoms or tardive dyskinesia.  Speech: Regular rate, rhythm, volume. Spontaneous, no pressured speech.  Mood: ***  Affect: Euthymic, full range, mood congruent.  Thought Process: Linear, logical, and goal-directed. No loose associations or gross thought disorganization.  Thought Content: Denied current suicidal ideation or thoughts of harm to self, denied homicidal ideation or thoughts of harm to others. No delusional thinking elicited. No perseverations or obsessions identified.   Perception: Did not endorse auditory or visual hallucinations, did not appear to be responding to hallucinatory stimuli.   Cognition: Alert, oriented x3. Preserved attention span and concentration, recent and remote memory. Adequate fund of knowledge. No deficits in language.   Insight: Fair, in regards to understanding mental health  condition  Judgement: Fair      Assessment/Plan   Yolis Castillo, a 28 y.o. female with PPH of MDD and anxiety, and PMH HIV presenting to [facility] for follow-up visit.    Patient had initially been seen at [facility] on 11/21/23 for evaluation of depressive and anxiety symptoms. Recommendation at that time was to increase Lexapro to 20mg, to initiate Topamax for sleep and nightmares, to discontinue Propranolol and start Atarax 10mg PRN anxiety.     Upon follow-up assessment,     Impression:  Trauma and stressor related disorder   Depression, unspecified  Anxiety    Risk Assessment:  Risk of harm to self: {PSYCH SUICIDE RISK:09707}    Risk of harm to others: {PSYCH VIOLENCE RISK:27557}    Plan/Recommendations:  Medications: ***  Orders: ***  Follow up: PCP  Call  Psychiatry at [phone] with issues.  For [facility] residents, [employer] is a 24/7 hotline you can call for assistance [[phone]]. Please call [phone] or go to your closest Emergency Room if you feel unsafe. This includes thoughts of hurting yourself or anyone else, or having other troubles such as hearing voices, seeing visions, or having new and scary thoughts about the people around you.    Review with patient: Medication risks/benefit reviewed with the patient    Seen and discussed with attending, Dr. Cote, who agrees with above.     Kevin Fall MD

## 2023-12-26 ENCOUNTER — APPOINTMENT (OUTPATIENT)
Dept: BEHAVIORAL HEALTH | Facility: CLINIC | Age: 28
End: 2023-12-26
Payer: COMMERCIAL

## 2024-02-07 DIAGNOSIS — G43.509 PERSISTENT MIGRAINE AURA WITHOUT CEREBRAL INFARCTION AND WITHOUT STATUS MIGRAINOSUS, NOT INTRACTABLE: Primary | ICD-10-CM

## 2024-02-07 RX ORDER — BUTALBITAL, ACETAMINOPHEN AND CAFFEINE 50; 325; 40 MG/1; MG/1; MG/1
1-2 TABLET ORAL EVERY 4 HOURS PRN
Qty: 30 TABLET | Refills: 0 | Status: SHIPPED | OUTPATIENT
Start: 2024-02-07 | End: 2024-03-04

## 2024-02-07 RX ORDER — BUTALBITAL, ACETAMINOPHEN AND CAFFEINE 50; 325; 40 MG/1; MG/1; MG/1
1 TABLET ORAL EVERY 4 HOURS PRN
Qty: 60 TABLET | Refills: 0 | Status: CANCELLED | OUTPATIENT
Start: 2024-02-07 | End: 2024-03-08

## 2024-02-14 DIAGNOSIS — R30.0 DYSURIA: ICD-10-CM

## 2024-02-14 DIAGNOSIS — R39.15 URGENCY OF URINATION: ICD-10-CM

## 2024-02-14 DIAGNOSIS — B20 HIV DISEASE (MULTI): ICD-10-CM

## 2024-02-14 DIAGNOSIS — R35.0 FREQUENCY OF URINATION: ICD-10-CM

## 2024-02-14 DIAGNOSIS — Z11.3 ROUTINE SCREENING FOR STI (SEXUALLY TRANSMITTED INFECTION): ICD-10-CM

## 2024-02-14 DIAGNOSIS — B20 HUMAN IMMUNODEFICIENCY VIRUS (HIV) DISEASE (MULTI): ICD-10-CM

## 2024-02-14 NOTE — PROGRESS NOTES
"Complains of pain in right pelvis.  States that \"she feels that something is there\".  Will see Dr. Finn next week.  Will come in Friday for lab work.  "

## 2024-02-19 ENCOUNTER — TELEMEDICINE (OUTPATIENT)
Dept: PRIMARY CARE | Facility: CLINIC | Age: 29
End: 2024-02-19
Payer: COMMERCIAL

## 2024-02-19 DIAGNOSIS — F41.9 ANXIETY: ICD-10-CM

## 2024-02-19 DIAGNOSIS — H53.9 VISION CHANGES: Primary | ICD-10-CM

## 2024-02-19 PROCEDURE — 1036F TOBACCO NON-USER: CPT | Performed by: INTERNAL MEDICINE

## 2024-02-19 PROCEDURE — 99213 OFFICE O/P EST LOW 20 MIN: CPT | Performed by: INTERNAL MEDICINE

## 2024-02-19 RX ORDER — HYDROXYZINE HYDROCHLORIDE 10 MG/1
10 TABLET, FILM COATED ORAL EVERY 6 HOURS PRN
Qty: 30 TABLET | Refills: 3 | Status: SHIPPED | OUTPATIENT
Start: 2024-02-19 | End: 2024-05-17 | Stop reason: ALTCHOICE

## 2024-02-19 ASSESSMENT — ENCOUNTER SYMPTOMS
SLEEP DISTURBANCE: 1
DYSPHORIC MOOD: 1
NERVOUS/ANXIOUS: 1
DECREASED CONCENTRATION: 1
CONSTITUTIONAL NEGATIVE: 1
SHORTNESS OF BREATH: 0

## 2024-02-19 NOTE — PROGRESS NOTES
"Establish patient visit - Patient originally scheduled for video, but asked to be changed to a telephone encounter.    Yolis Castillo is 28 y.o. a female   who presents for   Chief Complaint   Patient presents with    Follow-up     Follow up visit       Problem list   Depression  Anxiety  Asthma  HIV    HPI   Depression  At the last visit, she was referred to psychiatry for her anxiety. She reports being unsatisfied with the visits because they prescribed her multiple new medications - notably hydroxyzine and Topamax. She did not try either medication because she felt that they were \"just throwing medications [at her].\" The propranolol that I started was not effective and she has been using Benadryl OTC prn anxiety symptoms and sleep. She did start taking the escitalopram that was prescribed by the [facility] so she is now on bupropion and escitalopram.  Discussed that hydroxyzine is related to benadryl and might be effective as needed. Patient also completed some sessions of psychotherapy, but was seen by a resident and I discussed how residency precepting works on a practical level. Still has some insomnia and racing thoughts; the racing thoughts have improved since the last visit - she is no longer reporting them throughout the day when she is occupied, they now mostly occur at night and sometimes interfere with sleep. Discussed OTC melatonin as well. Feels overall her mood has improved significantly since our last visit.      Review of Systems   Constitutional: Negative.    Respiratory:  Negative for shortness of breath.    Cardiovascular:  Negative for chest pain.   Psychiatric/Behavioral:  Positive for decreased concentration, dysphoric mood and sleep disturbance. Negative for suicidal ideas. The patient is nervous/anxious.         Allergies   Allergen Reactions    Shellfish Containing Products Anaphylaxis      Physical Exam   N/a - telephone visit  Medications     Current Outpatient Medications   Medication " Instructions    acetaminophen (TYLENOL) 650 mg, oral, Every 4 hours PRN    albuterol 90 mcg/actuation inhaler 2 puffs, inhalation, Every 6 hours PRN    cqjenkrrz-odfafzuq-aepispn ala (Biktarvy) -25 mg tablet 1 tablet, oral, Daily    buPROPion XL (WELLBUTRIN XL) 300 mg, oral, Daily, as directed    butalbital-acetaminophen-caff -40 mg tablet 1-2 tablets, oral, Every 4 hours PRN, Use no more than 5/day, 10/week, 30/month.    cetirizine (ZYRTEC) 10 mg, oral, Daily    escitalopram (LEXAPRO) 20 mg, oral, Daily    hydrOXYzine HCL (ATARAX) 10 mg, oral, Every 6 hours PRN    ibuprofen 800 mg tablet TAKE ONE TABLET THREE TIMES A DAY FOR 5-10 DAYS PER MONTH FOR BREAKTHROUGH BLEEDING.    Slynd 4 mg (28) tablet 1 tablet, oral, Daily        Recent Labs     Lab Results   Component Value Date    CHOL 171 10/27/2023    LDLF 88 01/24/2019    HDL 58.0 10/27/2023    AST 18 10/27/2023    ALT 15 10/27/2023    BILITOT 0.3 10/27/2023        Lab Results   Component Value Date    WBC 7.1 10/27/2023    HGB 12.8 10/27/2023    HCT 41.1 10/27/2023    MCV 90 10/27/2023     10/27/2023          Chemistry    Lab Results   Component Value Date/Time     (H) 10/27/2023 1018    K 3.9 10/27/2023 1018     (H) 10/27/2023 1018    CO2 31 10/27/2023 1018    BUN 16 10/27/2023 1018    CREATININE 0.92 10/27/2023 1018    Lab Results   Component Value Date/Time    CALCIUM 9.1 10/27/2023 1018    ALKPHOS 78 10/27/2023 1018    AST 18 10/27/2023 1018    ALT 15 10/27/2023 1018    BILITOT 0.3 10/27/2023 1018             Assessment/Plan    Diagnoses and all orders for this visit:  Vision changes  -     Referral to Ophthalmology; Future  Patient feels she has eye strain and needs a new exam   Anxiety and Depression  -    Advised to try hydrOXYzine HCL (Atarax) 10 mg tablet; Take 1 tablet (10 mg) by mouth every 6 hours if needed for anxiety.  - Continue bupropion 300 mg and escitalopram 5 mg   - Discontinue propranolol - ineffective   -  Discontinue topiramate - never used   Other orders   -     Follow Up In Primary Care - Established; Future            RTC 4 months    Samara Miranda MD MPH  I am the attending physician.

## 2024-02-23 ENCOUNTER — TELEPHONE (OUTPATIENT)
Dept: IMMUNOLOGY | Facility: CLINIC | Age: 29
End: 2024-02-23
Payer: COMMERCIAL

## 2024-02-23 NOTE — TELEPHONE ENCOUNTER
SW returned call, introduced self as her SW while MJ is out of the office.  Pt has questions about insurance and medication.  Her Medicaid will stop at the end of the month due income increase.  Reviewed options and questions to address her HR, pt felt better and more informed and verbally agreed.  Also, pt is behind bills due to having to take time off from work for MH and other health appointments.  EFA application was completed and sent to our  for approval.  Pt will send more information after she talks with HR.  RW application was done by our rep, too.  Will continue to assist and monitor as needed.

## 2024-03-02 DIAGNOSIS — G43.509 PERSISTENT MIGRAINE AURA WITHOUT CEREBRAL INFARCTION AND WITHOUT STATUS MIGRAINOSUS, NOT INTRACTABLE: ICD-10-CM

## 2024-03-04 RX ORDER — BUTALBITAL, ACETAMINOPHEN AND CAFFEINE 50; 325; 40 MG/1; MG/1; MG/1
1-2 TABLET ORAL EVERY 4 HOURS PRN
Qty: 30 TABLET | Refills: 1 | Status: SHIPPED | OUTPATIENT
Start: 2024-03-04 | End: 2024-06-01

## 2024-04-11 ENCOUNTER — DOCUMENTATION (OUTPATIENT)
Dept: IMMUNOLOGY | Facility: CLINIC | Age: 29
End: 2024-04-11
Payer: COMMERCIAL

## 2024-04-11 NOTE — PROGRESS NOTES
Pt called this morning regarding feeling overwhelmed and not doing well in general.  She is still taking her wellbutrin and escitalopram but can't really tell if it is working or not. Pt agreed to speak with her  and they will be reaching out to her later this week.

## 2024-04-16 ENCOUNTER — TELEPHONE (OUTPATIENT)
Dept: IMMUNOLOGY | Facility: CLINIC | Age: 29
End: 2024-04-16
Payer: COMMERCIAL

## 2024-04-16 NOTE — TELEPHONE ENCOUNTER
Kasey calls Pt to check in. Pt reports that she's having a difficult time lately. Pt reports lack of sleep, and endorses up to two to three days at a time with little to no sleep at all. Pt states that she works from home and has very little human interaction other than with her seven year old daughter. Pt is considering taking some time off of work for her mental health, states that she's having a lot of trouble doing her job right now. Sw and Pt discuss her antidepressants - not sure if Wellbutrin is effective, is currently only taking 5mg Lexapro/day. Sw and Pt discuss increasing Lexapro to 10mg (Sw previously discussed this with MD who agreed with this plan - recommendation from  psychiatry a few months back was for Pt to increase dose to 20mg but Pt did not initiate same). Pt will start with increased dose today. Pt reports that she did try to see a therapist in the past but struggled to keep appts as she couldn't use her PTO. Kasey advises that there are therapists who have evening hours - they will assist Pt in finding providers who offer same so Pt can try to establish care. Pt denies SI/HI at this time, is aware of emergency numbers.    Pt endorses some missed ART, is doing her best to keep up with regimen. Pt is scheduled to see Dr. Lindquist on 4/26, doesn't know if she can keep as scheduled. Sw encourages Pt to keep if she can - at best, see if MD could see her via [facility]. Kasey will ask RN to check in with Pt next week re: same. Kasey emails MD and RN to provide update.

## 2024-04-17 ENCOUNTER — TELEPHONE (OUTPATIENT)
Dept: PRIMARY CARE | Facility: CLINIC | Age: 29
End: 2024-04-17
Payer: COMMERCIAL

## 2024-04-17 DIAGNOSIS — F32.2 CURRENT SEVERE EPISODE OF MAJOR DEPRESSIVE DISORDER WITHOUT PSYCHOTIC FEATURES, UNSPECIFIED WHETHER RECURRENT (MULTI): Primary | ICD-10-CM

## 2024-04-17 RX ORDER — ESCITALOPRAM OXALATE 10 MG/1
10 TABLET ORAL DAILY
Qty: 30 TABLET | Refills: 5 | Status: SHIPPED | OUTPATIENT
Start: 2024-04-17 | End: 2024-04-29 | Stop reason: WASHOUT

## 2024-04-17 NOTE — TELEPHONE ENCOUNTER
Spoke to patient. Has been severely depressed. Has difficulty sleeping. Difficulty cleaning, getting out of bed. Denies SI/HI. Grandfather in hospital, this had impacted her significantly. Looking into short term disability at work, which I support as long as we can get her linked with mental health care.     She would like to see a psychiatrist for continuity. Saw behavioral health access clinic but felt overwhelmed with medication options. Has continued to take wellbutrin and lexapro 5 mg.      Plan to increase lexapro to 10 mg, follow-up next week. She will send paperwork for short term disability.

## 2024-04-26 ENCOUNTER — APPOINTMENT (OUTPATIENT)
Dept: IMMUNOLOGY | Facility: CLINIC | Age: 29
End: 2024-04-26
Payer: COMMERCIAL

## 2024-04-29 ENCOUNTER — TELEPHONE (OUTPATIENT)
Dept: IMMUNOLOGY | Facility: CLINIC | Age: 29
End: 2024-04-29
Payer: COMMERCIAL

## 2024-04-29 DIAGNOSIS — F41.9 ANXIETY: ICD-10-CM

## 2024-04-29 DIAGNOSIS — F33.1 MODERATE EPISODE OF RECURRENT MAJOR DEPRESSIVE DISORDER (MULTI): Primary | ICD-10-CM

## 2024-04-29 RX ORDER — ESCITALOPRAM OXALATE 20 MG/1
20 TABLET ORAL DAILY
Qty: 30 TABLET | Refills: 2 | Status: SHIPPED | OUTPATIENT
Start: 2024-04-29 | End: 2024-07-28

## 2024-04-29 NOTE — TELEPHONE ENCOUNTER
Spoke with patient over the phone. She is currently taking short term disability due to severe depression episode. We discussed this and filled out paperwork to support this. I recommend about 8 weeks off (through 2024)    She continues to have very poor sleep and poor appetite for the last 1.5 months. She recently increased lexapro to 20 mg. Continues to take wellbutrin. Denies SI. She recently lost her grandfather, who was like a parent to her.  was on Friday. She is in shock and grieving.     Yolis is agreeable to see therapy and psychiatry. Social work will reach out to her to help with resources. We will plan a follow-up visit within 2 weeks.

## 2024-04-29 NOTE — TELEPHONE ENCOUNTER
Kasey calls Pt to follow up after her conversation with MD this morning. Pt also checked in with RN advising she was ready to speak to Sw and asked that they call her. Kasey has some counseling and psychiatry resources for Pt that have should have some evening hours - private and health center based.     [facility] OFF[facility]  (at [facility])  [id number] [place] [id number] [phone]  8:30am - 5:00pm M, T, Th, F  8:30am - 7:30pm W    [facility]  [address]  [place] [address] [phone]    [facility]  [phone]    [facility]  [address] [place] [address] [phone]?

## 2024-05-01 ENCOUNTER — DOCUMENTATION (OUTPATIENT)
Dept: IMMUNOLOGY | Facility: CLINIC | Age: 29
End: 2024-05-01
Payer: COMMERCIAL

## 2024-05-01 NOTE — PROGRESS NOTES
4/30/24    Kasey calls Pt to check in re: counseling resources. Pt reports that her Medicaid terms as of today, 4/30. Pt spoke to this Kasey colleague a few months ago re: same, but Medicaid remained active longer than she expected, and she did not follow up on choosing a new plan. Sw and Pt review Pt's options. Pt has a Marketplace plan - [employer] gold with vision and dental that will also cover her daughter. Sw assists Pt with completing enrollment. Kasey will update MD on Pt agreement to follow up with counseling - needs to have active insurance, however. Kasey emails Pt the list of resources they have right now - Pt to review. Pt to send her most recent pay stubs for Sw to enroll her in ADAP for JASON and co-pays. Kasey will also make sure that Pt's Maikel White up-to-date. Kasey will meet with Pt at upcoming MD appt.     From: Pau Banuelos   Sent: Tuesday, April 30, 2024 4:18 PM  To: [email or web address]  Subject: Resources    Zain Lagos,    As we talked about on the phone, you can send me your two most recent pay stubs to me here at this email address. The following are some counseling resources that should accept your new CareSource plan (and would also accept Medicaid if/when that potentially can be reinstated). Don't be alarmed by the name of the second group - the therapists at [facility] are wonderful, and work with more than just sex and intimacy related concerns - I highly recommend both the owner of the practice, and a few of the clinicians who I know outside of .    [facility] OFFICE  (at [facility])  [address] [place] [address] [phone]  8:30am - 5:00pm M, T, Th, F  8:30am - 7:30pm KATINA     [facility]  [address]  [place] [address] [phone]     [facility]  [phone]     [address] [address] [phone]?    I'll see you on Friday! Please let me know if you have any  questions.    Pau “MISSAEL” SHABNAM Banuelos  Pronouns: they/them (What is this?)   III  [facility]  Phone: [phone]  Fax: [phone]

## 2024-05-03 ENCOUNTER — OFFICE VISIT (OUTPATIENT)
Dept: IMMUNOLOGY | Facility: CLINIC | Age: 29
End: 2024-05-03
Payer: COMMERCIAL

## 2024-05-03 ENCOUNTER — DOCUMENTATION (OUTPATIENT)
Dept: IMMUNOLOGY | Facility: CLINIC | Age: 29
End: 2024-05-03

## 2024-05-03 VITALS
BODY MASS INDEX: 33.34 KG/M2 | HEIGHT: 68 IN | SYSTOLIC BLOOD PRESSURE: 111 MMHG | RESPIRATION RATE: 16 BRPM | TEMPERATURE: 98 F | OXYGEN SATURATION: 100 % | HEART RATE: 76 BPM | WEIGHT: 220 LBS | DIASTOLIC BLOOD PRESSURE: 75 MMHG

## 2024-05-03 DIAGNOSIS — F41.9 ANXIETY: ICD-10-CM

## 2024-05-03 DIAGNOSIS — F33.1 MODERATE EPISODE OF RECURRENT MAJOR DEPRESSIVE DISORDER (MULTI): ICD-10-CM

## 2024-05-03 DIAGNOSIS — R05.2 SUBACUTE COUGH: ICD-10-CM

## 2024-05-03 DIAGNOSIS — B20 HUMAN IMMUNODEFICIENCY VIRUS (MULTI): Primary | ICD-10-CM

## 2024-05-03 PROCEDURE — 99214 OFFICE O/P EST MOD 30 MIN: CPT | Performed by: FAMILY MEDICINE

## 2024-05-03 PROCEDURE — 1036F TOBACCO NON-USER: CPT | Performed by: FAMILY MEDICINE

## 2024-05-03 RX ORDER — BICTEGRAVIR SODIUM, EMTRICITABINE, AND TENOFOVIR ALAFENAMIDE FUMARATE 50; 200; 25 MG/1; MG/1; MG/1
1 TABLET ORAL DAILY
Qty: 30 TABLET | Refills: 5 | Status: SHIPPED | OUTPATIENT
Start: 2024-05-03 | End: 2024-10-30

## 2024-05-03 NOTE — LETTER
05/03/24    Samara Miranda MD MPH  [address]  Department Of Medicine-General Internal  [facility] [address]      Dear Dr. Samara Miranda MD MPH,    I am writing to confirm that your patient, Yolis Castillo, received care in my office on 05/03/24. I have enclosed a summary of the care provided to Yolis for your reference.    Please contact me with any questions you may have regarding the visit.    Sincerely,         Jess Lindquist MD  [address] [facility] [address]  [facility] [address]  [phone]    CC: No Recipients

## 2024-05-03 NOTE — PROGRESS NOTES
"Subjective   Patient ID: Yolis Castillo is a 28 y.o. who presents for virtual visit to follow-up on depression. Transitioned to phone visit.   HPI    Sore throat, runny nose- started 1 week ago  - Cough just started  - No shortness of breath  - Has sensation of low grade temp but no fever when she has checked it    HIV- well controlled, taking Biktarvy  - Just ran out of medication  - Out of insurance, needs assistance     Depression and anxiety  - See telephone visits for further information  - Briefly, depression has worsened significantly over the last several months  - Has significant issues with sleep and with motivation   - Denies SI   - Difficulty with sleep at night- Lots of mind racing, stomachs in knots  - Tired hydroxyzine, that didn't help  - Has nightmares and vivid dreams. Most relate to her HIV diagnosis  - No motivation during the day   - Looking into counseling and psychiatry, however without insurance this has been very challenging   - Currently on leave from work as she was unable to perform job duties       Review of Systems  10 point review of systems negative except as noted in HPI.    Objective     /75 (BP Location: Left arm, Patient Position: Sitting, BP Cuff Size: Adult)   Pulse 76   Temp 36.7 °C (98 °F) (Skin)   Resp 16   Ht 1.727 m (5' 8\")   Wt 99.8 kg (220 lb)   SpO2 100%   BMI 33.45 kg/m²   General: well appearing, no distress  HEENT: Throat with erythema and post nasal drip  CV: Regular rate and rhythm, no murmur  Lungs: Clear to auscultation bilaterally  Abdomen: Soft, nontender, nondistended  Extremities: No edema noted  Psych: Appropriate mood and affect        Assessment/Plan   29 yo F here for follow-up    Depression and anxiety  -Continue lexapro and wellbutrin  - Start topiramate 25 mg  - Resources for counseling and psychiatry care    HIV  - Well controlled, continue current regimen  - Check labs next visit    Cough, runny nose  - Likely viral. Counseled on " conservative management  - If cough worsens will have low threshold to check chest xray    RTC 2 weeks

## 2024-05-03 NOTE — PROGRESS NOTES
Sw meets with Pt at MD appt as discussed. Pt able to bring Sw one of her pay stubs - waiting to get into system (HR has to assist) in order to get the second stub that Sw needs to complete ADAP enrollment. Pt is running low on ART. Sw completes Advancing Access application for Pt to be able to get her Biktarvy while ADAP process is pending. Sw provides Pt with ID card she can take to pharmacy. Pt aware that her non-ART Rx will be self-pay until ADAP can be completed. Pt completed PHQ-9 today - score of 20. Pt denied SI/HI. Sw will continue to work with Pt and the rest of her care team to help her connect with MH Tx once insurance in place. Pt has emergency numbers. Pt not feeling well today so Sw did not complete ISP with her. Sw will follow and complete with Pt ASAP. Pt will continue to contact Sw if any issues or barriers come up.

## 2024-05-17 ENCOUNTER — OFFICE VISIT (OUTPATIENT)
Dept: IMMUNOLOGY | Facility: CLINIC | Age: 29
End: 2024-05-17
Payer: COMMERCIAL

## 2024-05-17 ENCOUNTER — SOCIAL WORK (OUTPATIENT)
Dept: IMMUNOLOGY | Facility: CLINIC | Age: 29
End: 2024-05-17

## 2024-05-17 VITALS
HEART RATE: 104 BPM | OXYGEN SATURATION: 97 % | WEIGHT: 218 LBS | RESPIRATION RATE: 16 BRPM | TEMPERATURE: 98.2 F | BODY MASS INDEX: 33.04 KG/M2 | DIASTOLIC BLOOD PRESSURE: 85 MMHG | HEIGHT: 68 IN | SYSTOLIC BLOOD PRESSURE: 145 MMHG

## 2024-05-17 DIAGNOSIS — F41.9 ANXIETY: ICD-10-CM

## 2024-05-17 DIAGNOSIS — B20 HUMAN IMMUNODEFICIENCY VIRUS (MULTI): Primary | ICD-10-CM

## 2024-05-17 LAB
ALBUMIN SERPL BCP-MCNC: 4.6 G/DL (ref 3.4–5)
ALP SERPL-CCNC: 75 U/L (ref 33–110)
ALT SERPL W P-5'-P-CCNC: 18 U/L (ref 7–45)
ANION GAP SERPL CALC-SCNC: 17 MMOL/L (ref 10–20)
AST SERPL W P-5'-P-CCNC: 26 U/L (ref 9–39)
BASOPHILS # BLD AUTO: 0.06 X10*3/UL (ref 0–0.1)
BASOPHILS NFR BLD AUTO: 0.9 %
BILIRUB SERPL-MCNC: 0.6 MG/DL (ref 0–1.2)
BUN SERPL-MCNC: 8 MG/DL (ref 6–23)
CALCIUM SERPL-MCNC: 9.7 MG/DL (ref 8.6–10.6)
CHLORIDE SERPL-SCNC: 102 MMOL/L (ref 98–107)
CO2 SERPL-SCNC: 24 MMOL/L (ref 21–32)
CREAT SERPL-MCNC: 0.86 MG/DL (ref 0.5–1.05)
EGFRCR SERPLBLD CKD-EPI 2021: >90 ML/MIN/1.73M*2
EOSINOPHIL # BLD AUTO: 0.17 X10*3/UL (ref 0–0.7)
EOSINOPHIL NFR BLD AUTO: 2.5 %
ERYTHROCYTE [DISTWIDTH] IN BLOOD BY AUTOMATED COUNT: 14.5 % (ref 11.5–14.5)
GLUCOSE SERPL-MCNC: 83 MG/DL (ref 74–99)
HCT VFR BLD AUTO: 44.7 % (ref 36–46)
HGB BLD-MCNC: 14.2 G/DL (ref 12–16)
IMM GRANULOCYTES # BLD AUTO: 0.01 X10*3/UL (ref 0–0.7)
IMM GRANULOCYTES NFR BLD AUTO: 0.1 % (ref 0–0.9)
LYMPHOCYTES # BLD AUTO: 3.26 X10*3/UL (ref 1.2–4.8)
LYMPHOCYTES NFR BLD AUTO: 48.4 %
MCH RBC QN AUTO: 28.1 PG (ref 26–34)
MCHC RBC AUTO-ENTMCNC: 31.8 G/DL (ref 32–36)
MCV RBC AUTO: 88 FL (ref 80–100)
MONOCYTES # BLD AUTO: 1 X10*3/UL (ref 0.1–1)
MONOCYTES NFR BLD AUTO: 14.8 %
NEUTROPHILS # BLD AUTO: 2.24 X10*3/UL (ref 1.2–7.7)
NEUTROPHILS NFR BLD AUTO: 33.3 %
NRBC BLD-RTO: 0 /100 WBCS (ref 0–0)
PLATELET # BLD AUTO: 258 X10*3/UL (ref 150–450)
POTASSIUM SERPL-SCNC: 4.1 MMOL/L (ref 3.5–5.3)
PROT SERPL-MCNC: 7.9 G/DL (ref 6.4–8.2)
RBC # BLD AUTO: 5.06 X10*6/UL (ref 4–5.2)
SODIUM SERPL-SCNC: 139 MMOL/L (ref 136–145)
TREPONEMA PALLIDUM IGG+IGM AB [PRESENCE] IN SERUM OR PLASMA BY IMMUNOASSAY: REACTIVE
WBC # BLD AUTO: 6.7 X10*3/UL (ref 4.4–11.3)

## 2024-05-17 PROCEDURE — 86780 TREPONEMA PALLIDUM: CPT | Performed by: FAMILY MEDICINE

## 2024-05-17 PROCEDURE — 88185 FLOWCYTOMETRY/TC ADD-ON: CPT | Mod: TC | Performed by: FAMILY MEDICINE

## 2024-05-17 PROCEDURE — 99213 OFFICE O/P EST LOW 20 MIN: CPT | Performed by: FAMILY MEDICINE

## 2024-05-17 PROCEDURE — 86318 IA INFECTIOUS AGENT ANTIBODY: CPT | Performed by: FAMILY MEDICINE

## 2024-05-17 PROCEDURE — 1036F TOBACCO NON-USER: CPT | Performed by: FAMILY MEDICINE

## 2024-05-17 PROCEDURE — 36415 COLL VENOUS BLD VENIPUNCTURE: CPT | Performed by: FAMILY MEDICINE

## 2024-05-17 PROCEDURE — 84075 ASSAY ALKALINE PHOSPHATASE: CPT | Performed by: FAMILY MEDICINE

## 2024-05-17 PROCEDURE — 85025 COMPLETE CBC W/AUTO DIFF WBC: CPT | Performed by: FAMILY MEDICINE

## 2024-05-17 PROCEDURE — 87536 HIV-1 QUANT&REVRSE TRNSCRPJ: CPT | Performed by: FAMILY MEDICINE

## 2024-05-17 RX ORDER — HYDROXYZINE HYDROCHLORIDE 10 MG/1
20 TABLET, FILM COATED ORAL DAILY PRN
Qty: 60 TABLET | Refills: 3 | Status: SHIPPED | OUTPATIENT
Start: 2024-05-17 | End: 2024-09-14

## 2024-05-17 RX ORDER — HYDROXYZINE HYDROCHLORIDE 10 MG/1
10 TABLET, FILM COATED ORAL EVERY 6 HOURS PRN
Qty: 30 TABLET | Refills: 3 | Status: SHIPPED | OUTPATIENT
Start: 2024-05-17 | End: 2024-05-17

## 2024-05-17 ASSESSMENT — PAIN SCALES - GENERAL: PAINLEVEL: 0-NO PAIN

## 2024-05-17 NOTE — LETTER
05/17/24    Samara Miranda MD MPH  [address]  Department Of Medicine-General Internal  [facility] [address]      Dear Dr. Samara Miranda MD MPH,    I am writing to confirm that your patient, Yolis Castillo, received care in my office on 05/17/24. I have enclosed a summary of the care provided to Yolis for your reference.    Please contact me with any questions you may have regarding the visit.    Sincerely,         Jess Lindquist MD  [address] [facility] [address]  [facility] [address]  [phone]    CC: No Recipients

## 2024-05-17 NOTE — PROGRESS NOTES
"Subjective   Patient ID: Yolis Castillo is a 29 y.o. who presents for follow-up    HPI    Viral URI- much improved     HIV- well controlled, taking Biktarvy  - Doing well, no missed doses. Although out of insurance     Depression and anxiety  - Has been dealing with increased depression/anxiety. Has been seeing me frequently for this.   - Has been off work, unable to perform job duties while dealing with a flare   - Overall very slight improvement, however has not been able to see psychiatry or counseling due to lack of insurance  - Taking lexapro and wellbutrin consistently  - Topiramate makes her groggy in the morning  - Hydroxyzine - helps her stay asleep  - Sleeping during the day more than at night  - Yesterday 5 hours of sleep  - Nightmares are still bad   - Mom relationship is rough- frustrated that Yolis is so down  - Has been managing with childcare on her own   - Continues to lack motivation       Review of Systems  10 point review of systems negative except as noted in HPI.    Objective     /85 (BP Location: Left arm, Patient Position: Sitting, BP Cuff Size: Adult)   Pulse 104   Temp 36.8 °C (98.2 °F) (Skin)   Resp 16   Ht 1.727 m (5' 8\")   Wt 98.9 kg (218 lb)   SpO2 97%   BMI 33.15 kg/m²   General: well appearing, no distress  CV: Regular rate and rhythm, no murmur  Lungs: Clear to auscultation bilaterally  Abdomen: Soft, nontender, nondistended  Extremities: No edema noted  Psych: Appropriate mood and affect        Assessment/Plan   30 yo F here for follow-up    Depression and anxiety  -Continue lexapro and wellbutrin  - Resume hydroxyzine- ok to increase dose. She would like to continue with the 10 mg tablets but may increase to 2 tablets at night for sleep  - Resources for counseling and psychiatry care  - Will meet with social work  - Recommend staying off work until June 14, or until further notice     HIV  - Well controlled, continue current regimen  - Check labs    RTC 2 weeks  "

## 2024-05-17 NOTE — PROGRESS NOTES
"SW met with pt in clinic at her regular doctor's visit.  She feels down and upset with her current state including mental health.  Shared some of her past concerns and worries and coping mechanisms she used in the past.  Unfortunately, she feels this is \"the worse I have been, it's hard to get out of the house or clean it.\"  She has a new puppy and that keeps her engaged and started taking walks daily.  Everything she does is for her daughter and has committed to get better.    SW initiated pt on problem solving and DBT and mindfulness exercises like engaging her senses and breathing exercises.  Discussed \"hope\"  and pt agreed to start using Gratitude journal daily.  Unfortunately, pt did not bring in her paystub...states that her work sent the wrong one.   She will call HR after visit and will email it to MISSAEL or this JANIYA today.  Pt signed up for insurance starting 5/1 but will not be able to pay for it and will need ADAP to send them a check asap.  JANIYA explained that without the paystub her ADAP application will not be completed...Pt states understanding and will ask her HR for a rush on paystub.  Pt will see MH provider when her insurance issue gets resolved and her insurance will become active when the bill is paid.  Pt is not able to pay her premium and hoping that ADAP will do so.  Reminded pt to send information asap in order for MISSAEL DENSON to complete ADAP.  Pt agreed to engage with women's group and yoga group and thinks will help her with symptoms control.  This JANIYA will sign off.  "

## 2024-05-18 LAB
RPR SER QL: REACTIVE
RPR SER-TITR: ABNORMAL {TITER}

## 2024-05-20 LAB
CD3+CD4+ CELLS # BLD: 0.91 X10E9/L
CD3+CD4+ CELLS # BLD: 913 /MM3
CD3+CD4+ CELLS NFR BLD: 28 %
CD3+CD4+ CELLS/CD3+CD8+ CLL BLD: 0.61 %
CD3+CD8+ CELLS # BLD: 1.5 X10E9/L
CD3+CD8+ CELLS NFR BLD: 46 %
LYMPHOCYTES # SPEC AUTO: 3.26 X10*3/UL

## 2024-05-21 LAB
HIV1 RNA # PLAS NAA DL=20: ABNORMAL {COPIES}/ML
HIV1 RNA SPEC NAA+PROBE-LOG#: ABNORMAL {LOG_COPIES}/ML

## 2024-06-01 DIAGNOSIS — G43.509 PERSISTENT MIGRAINE AURA WITHOUT CEREBRAL INFARCTION AND WITHOUT STATUS MIGRAINOSUS, NOT INTRACTABLE: ICD-10-CM

## 2024-06-01 RX ORDER — BUTALBITAL, ACETAMINOPHEN AND CAFFEINE 50; 325; 40 MG/1; MG/1; MG/1
1-2 TABLET ORAL EVERY 4 HOURS PRN
Qty: 30 TABLET | Refills: 1 | Status: SHIPPED | OUTPATIENT
Start: 2024-06-01 | End: 2025-01-27

## 2024-06-07 ENCOUNTER — OFFICE VISIT (OUTPATIENT)
Dept: IMMUNOLOGY | Facility: CLINIC | Age: 29
End: 2024-06-07

## 2024-06-07 VITALS
HEART RATE: 95 BPM | RESPIRATION RATE: 16 BRPM | OXYGEN SATURATION: 99 % | TEMPERATURE: 98 F | HEIGHT: 68 IN | BODY MASS INDEX: 34.25 KG/M2 | DIASTOLIC BLOOD PRESSURE: 84 MMHG | SYSTOLIC BLOOD PRESSURE: 120 MMHG | WEIGHT: 226 LBS

## 2024-06-07 DIAGNOSIS — B20 HUMAN IMMUNODEFICIENCY VIRUS (MULTI): Primary | ICD-10-CM

## 2024-06-07 DIAGNOSIS — F41.9 ANXIETY: ICD-10-CM

## 2024-06-07 PROCEDURE — 99213 OFFICE O/P EST LOW 20 MIN: CPT | Performed by: FAMILY MEDICINE

## 2024-06-07 PROCEDURE — 1036F TOBACCO NON-USER: CPT | Performed by: FAMILY MEDICINE

## 2024-06-07 ASSESSMENT — PAIN SCALES - GENERAL: PAINLEVEL: 0-NO PAIN

## 2024-06-07 NOTE — PROGRESS NOTES
"Subjective   Patient ID: Yolis Castillo is a 29 y.o. who presents for follow-up    HPI    HIV- well controlled, taking Biktarvy  - Doing well, no missed doses  - Labs stable last visit     Depression and anxiety  - Symptoms improving   - Hydroxyzine works. Taking it most nights of the week   - Motivation has remained unchanged overall, however now able to walk dog, care for daughter  - Working on coping with grief  - has not been able to establish with counseling yet   - Taking lexapro and wellbutrin consistently  - Has been off work, will be returning on 6/14       Review of Systems  10 point review of systems negative except as noted in HPI.    Objective     /84 (BP Location: Left arm, Patient Position: Sitting, BP Cuff Size: Large adult)   Pulse 95   Temp 36.7 °C (98 °F) (Temporal)   Resp 16   Ht 1.727 m (5' 8\")   Wt 103 kg (226 lb)   SpO2 99%   BMI 34.36 kg/m²   General: well appearing, no distress  CV: Regular rate and rhythm, no murmur  Lungs: Clear to auscultation bilaterally  Abdomen: Soft, nontender, nondistended  Extremities: No edema noted  Psych: Appropriate mood and affect        Assessment/Plan   30 yo F here for follow-up    Depression and anxiety  -Continue lexapro, wellbutrin, hydroxyzine   - Resources for counseling and psychiatry care  - Return to work set for 6/14. OK to return at this time as she has made progress. She will need to have intermittent leave available to attending therapy appointment s    HIV  - Well controlled, continue current regimen    RTC 1 month  "

## 2024-06-07 NOTE — LETTER
06/07/24    Samara Miranda MD MPH  [address]  Department Of Medicine-General Internal  [facility] [address]      Dear Dr. Samara Miranda MD MPH,    I am writing to confirm that your patient, Yolis Castillo, received care in my office on 06/07/24. I have enclosed a summary of the care provided to Yolis for your reference.    Please contact me with any questions you may have regarding the visit.    Sincerely,         Jess Lindquist MD  [address] [facility] [address]  [facility] [address]  [phone]    CC: No Recipients

## 2024-06-18 ENCOUNTER — APPOINTMENT (OUTPATIENT)
Dept: PRIMARY CARE | Facility: CLINIC | Age: 29
End: 2024-06-18

## 2024-06-24 ENCOUNTER — TELEPHONE (OUTPATIENT)
Dept: IMMUNOLOGY | Facility: CLINIC | Age: 29
End: 2024-06-24

## 2024-06-24 NOTE — TELEPHONE ENCOUNTER
Pt calls to report that she has a $50 copay for her ART. Sw emails Pt a copy of the [facility] billing ID card with her ID number.  Pt responds to email - will look into therapy resources provided by Sw. Pt also attaches  bill for Sw to forward to mary staff. Pt will advise Sw if she has any further issues.       From: Yolis Castillo <[email or web address]>   Sent: Monday, June 24, 2024 3:08 PM  To: Pau Banuelos <[email or web address]>  Subject: Re: ADAP Billing Info`      Thanks so much for all of your help! I really can't thank you enough and now that most of this is taken care of I'm finally ready to revisit the therapy thing. I remember you also asking me to send you a copy of the bill i got from  so I attached it here      Yolis Castillo   [phone]      On Jun 24, 2024, at 3:04?PM, Pau Banuelos <[email or web address]> wrote:  ?   Zain Lagos,     As we discussed here is the billing ID card you can show the pharmacist. Your ID number is [id number]     You should have zero copays on any of your medication as long as it's not a scheduled drug (I don't think any of yours are).      Let me know if you have any issues and I'll call the pharmacy.     Take care,     Pau “MISSAEL” SHABNAM Banuelos  Pronouns: they/them (What is this?)   [facility]  Phone: [phone]  Fax: [phone]

## 2024-07-12 ENCOUNTER — TELEMEDICINE (OUTPATIENT)
Dept: IMMUNOLOGY | Facility: CLINIC | Age: 29
End: 2024-07-12

## 2024-07-12 DIAGNOSIS — F41.9 ANXIETY: ICD-10-CM

## 2024-07-12 DIAGNOSIS — B20 CURRENTLY ASYMPTOMATIC HIV INFECTION, WITH HISTORY OF HIV-RELATED ILLNESS (MULTI): Primary | ICD-10-CM

## 2024-07-12 PROCEDURE — 99213 OFFICE O/P EST LOW 20 MIN: CPT | Performed by: FAMILY MEDICINE

## 2024-07-12 RX ORDER — HYDROXYZINE HYDROCHLORIDE 25 MG/1
25 TABLET, FILM COATED ORAL DAILY
Qty: 30 TABLET | Refills: 5 | Status: SHIPPED | OUTPATIENT
Start: 2024-07-12

## 2024-07-12 RX ORDER — HYDROXYZINE HYDROCHLORIDE 10 MG/1
TABLET, FILM COATED ORAL
Qty: 30 TABLET | Refills: 5 | Status: SHIPPED | OUTPATIENT
Start: 2024-07-12

## 2024-07-12 NOTE — PROGRESS NOTES
Subjective   Patient ID: Yolis Castillo is a 29 y.o. who presents for follow-up    HPI    HIV- well controlled, taking Biktarvy  - Doing well, no missed doses  - Labs stable in May    Depression and anxiety  - Back to work, its going ok. Having some issues with HR regarding her leave.   - Hasn't been able to set up with counseling. Plans to make some calls next week  - Overall feeling much better. Hydroxyzine helps with sleep significantly. She wants to increase dose if possible  - Motivation has improved  - Grief remains challenging, but manageable   - Taking lexapro and wellbutrin consistently  - Occasionally gets diarrhea, concerned about IBS. No blood in stool. Seems to be diet related as well. Triggered by red meat and greasy food      Review of Systems  10 point review of systems negative except as noted in HPI.    Objective     There were no vitals taken for this visit.  Gen: Well appearing, no acute distress  HEENT: Mucous membranes moist  Pulm: Respirations nonlabored  Psych: Normal mood and affect      Assessment/Plan   30 yo F here for follow-up    Depression and anxiety  -Continue lexapro, wellbutrin. Increase hydroxyzine to 25 mg nightly with additional 10 mg if needed  - Follow-up counseling  - Discuss diarrhea more at follow-up visit. No red flags. Monitor for triggers    HIV  - Well controlled, continue current regimen    RTC 2 months

## 2024-07-17 DIAGNOSIS — Z30.41 ENCOUNTER FOR SURVEILLANCE OF CONTRACEPTIVE PILLS: ICD-10-CM

## 2024-09-04 DIAGNOSIS — Z59.41 FOOD INSECURITY: ICD-10-CM

## 2024-09-04 SDOH — ECONOMIC STABILITY: FOOD INSECURITY: WITHIN THE PAST 12 MONTHS, YOU WORRIED THAT YOUR FOOD WOULD RUN OUT BEFORE YOU GOT MONEY TO BUY MORE.: SOMETIMES TRUE

## 2024-09-04 SDOH — ECONOMIC STABILITY: FOOD INSECURITY: WITHIN THE PAST 12 MONTHS, THE FOOD YOU BOUGHT JUST DIDN'T LAST AND YOU DIDN'T HAVE MONEY TO GET MORE.: OFTEN TRUE

## 2024-09-04 SDOH — ECONOMIC STABILITY - FOOD INSECURITY: FOOD INSECURITY: Z59.41

## 2024-09-04 NOTE — PROGRESS NOTES
Pt requesting FFL referral as she was on leave from work and has fallen behind on some of her bills.

## 2024-11-01 DIAGNOSIS — B20 CURRENTLY ASYMPTOMATIC HIV INFECTION, WITH HISTORY OF HIV-RELATED ILLNESS (MULTI): ICD-10-CM

## 2024-11-19 ENCOUNTER — CLINICAL SUPPORT (OUTPATIENT)
Dept: IMMUNOLOGY | Facility: CLINIC | Age: 29
End: 2024-11-19
Payer: MEDICARE

## 2024-11-19 DIAGNOSIS — B20 CURRENTLY ASYMPTOMATIC HIV INFECTION, WITH HISTORY OF HIV-RELATED ILLNESS (MULTI): ICD-10-CM

## 2024-11-19 LAB
ALBUMIN SERPL BCP-MCNC: 4.7 G/DL (ref 3.4–5)
ALP SERPL-CCNC: 66 U/L (ref 33–110)
ALT SERPL W P-5'-P-CCNC: 25 U/L (ref 7–45)
ANION GAP SERPL CALC-SCNC: 14 MMOL/L (ref 10–20)
AST SERPL W P-5'-P-CCNC: 19 U/L (ref 9–39)
BASOPHILS # BLD AUTO: 0.04 X10*3/UL (ref 0–0.1)
BASOPHILS NFR BLD AUTO: 0.6 %
BILIRUB SERPL-MCNC: 0.6 MG/DL (ref 0–1.2)
BUN SERPL-MCNC: 15 MG/DL (ref 6–23)
CALCIUM SERPL-MCNC: 9.6 MG/DL (ref 8.6–10.6)
CHLORIDE SERPL-SCNC: 103 MMOL/L (ref 98–107)
CHOLEST SERPL-MCNC: 224 MG/DL (ref 0–199)
CHOLESTEROL/HDL RATIO: 2.7
CO2 SERPL-SCNC: 26 MMOL/L (ref 21–32)
CREAT SERPL-MCNC: 1 MG/DL (ref 0.5–1.05)
EGFRCR SERPLBLD CKD-EPI 2021: 78 ML/MIN/1.73M*2
EOSINOPHIL # BLD AUTO: 0.21 X10*3/UL (ref 0–0.7)
EOSINOPHIL NFR BLD AUTO: 3.1 %
ERYTHROCYTE [DISTWIDTH] IN BLOOD BY AUTOMATED COUNT: 12.7 % (ref 11.5–14.5)
GLUCOSE SERPL-MCNC: 79 MG/DL (ref 74–99)
HCT VFR BLD AUTO: 39.7 % (ref 36–46)
HDLC SERPL-MCNC: 83.8 MG/DL
HGB BLD-MCNC: 12.9 G/DL (ref 12–16)
IMM GRANULOCYTES # BLD AUTO: 0.01 X10*3/UL (ref 0–0.7)
IMM GRANULOCYTES NFR BLD AUTO: 0.1 % (ref 0–0.9)
LDLC SERPL CALC-MCNC: 126 MG/DL
LYMPHOCYTES # BLD AUTO: 3.99 X10*3/UL (ref 1.2–4.8)
LYMPHOCYTES NFR BLD AUTO: 58.6 %
MCH RBC QN AUTO: 27.9 PG (ref 26–34)
MCHC RBC AUTO-ENTMCNC: 32.5 G/DL (ref 32–36)
MCV RBC AUTO: 86 FL (ref 80–100)
MONOCYTES # BLD AUTO: 0.85 X10*3/UL (ref 0.1–1)
MONOCYTES NFR BLD AUTO: 12.5 %
NEUTROPHILS # BLD AUTO: 1.71 X10*3/UL (ref 1.2–7.7)
NEUTROPHILS NFR BLD AUTO: 25.1 %
NON HDL CHOLESTEROL: 140 MG/DL (ref 0–149)
NRBC BLD-RTO: 0 /100 WBCS (ref 0–0)
PLATELET # BLD AUTO: 244 X10*3/UL (ref 150–450)
POTASSIUM SERPL-SCNC: 4.2 MMOL/L (ref 3.5–5.3)
PROT SERPL-MCNC: 7.4 G/DL (ref 6.4–8.2)
RBC # BLD AUTO: 4.63 X10*6/UL (ref 4–5.2)
SODIUM SERPL-SCNC: 139 MMOL/L (ref 136–145)
TRIGL SERPL-MCNC: 71 MG/DL (ref 0–149)
VLDL: 14 MG/DL (ref 0–40)
WBC # BLD AUTO: 6.8 X10*3/UL (ref 4.4–11.3)

## 2024-11-19 PROCEDURE — 87536 HIV-1 QUANT&REVRSE TRNSCRPJ: CPT

## 2024-11-19 PROCEDURE — 84478 ASSAY OF TRIGLYCERIDES: CPT

## 2024-11-19 PROCEDURE — 82310 ASSAY OF CALCIUM: CPT

## 2024-11-19 PROCEDURE — 86318 IA INFECTIOUS AGENT ANTIBODY: CPT

## 2024-11-19 PROCEDURE — 85025 COMPLETE CBC W/AUTO DIFF WBC: CPT

## 2024-11-19 PROCEDURE — 88185 FLOWCYTOMETRY/TC ADD-ON: CPT

## 2024-11-19 PROCEDURE — 36415 COLL VENOUS BLD VENIPUNCTURE: CPT

## 2024-11-20 LAB
CD3+CD4+ CELLS # BLD: 0.88 X10E9/L
CD3+CD4+ CELLS # BLD: 878 /MM3
CD3+CD4+ CELLS NFR BLD: 22 %
CD3+CD4+ CELLS/CD3+CD8+ CLL BLD: 0.46 %
CD3+CD8+ CELLS # BLD: 1.92 X10E9/L
CD3+CD8+ CELLS NFR BLD: 48 %
HIV1 RNA # PLAS NAA DL=20: ABNORMAL {COPIES}/ML
HIV1 RNA SPEC NAA+PROBE-LOG#: ABNORMAL {LOG_COPIES}/ML
LYMPHOCYTES # SPEC AUTO: 3.99 X10*3/UL
RPR SER QL: REACTIVE
RPR SER-TITR: ABNORMAL {TITER}

## 2024-11-22 ENCOUNTER — TELEMEDICINE (OUTPATIENT)
Dept: IMMUNOLOGY | Facility: CLINIC | Age: 29
End: 2024-11-22
Payer: MEDICARE

## 2024-11-22 DIAGNOSIS — B20 HUMAN IMMUNODEFICIENCY VIRUS (MULTI): Primary | ICD-10-CM

## 2024-11-22 DIAGNOSIS — A53.9 SYPHILIS: ICD-10-CM

## 2024-11-22 DIAGNOSIS — R19.7 DIARRHEA, UNSPECIFIED TYPE: ICD-10-CM

## 2024-11-22 DIAGNOSIS — F41.9 ANXIETY: ICD-10-CM

## 2024-11-22 PROCEDURE — 1036F TOBACCO NON-USER: CPT | Performed by: FAMILY MEDICINE

## 2024-11-22 PROCEDURE — 99214 OFFICE O/P EST MOD 30 MIN: CPT | Performed by: FAMILY MEDICINE

## 2024-11-22 RX ORDER — BICTEGRAVIR SODIUM, EMTRICITABINE, AND TENOFOVIR ALAFENAMIDE FUMARATE 50; 200; 25 MG/1; MG/1; MG/1
1 TABLET ORAL DAILY
COMMUNITY
Start: 2024-11-18

## 2024-11-22 NOTE — PROGRESS NOTES
Subjective   Patient ID: Yolis Castillo is a 29 y.o. who presents for follow-up via virtual visit    HPI    HIV- well controlled, taking Biktarvy  - Doing well, no missed doses  - Labs stable recently    Diarrhea  - No blood in stool  - Intermittent constipation  - Going every hour when its bad  - Can happen up to 3 times a week    Pain on left leg  - Skin feels numbness and pain  - Feels this down whole leg down to foot  - Started about 1 month ago   - No pain with ambulation, but does note some numbness   - No injury to leg or back, but does endorse back pain  - Has history of sciatica     Depression and anxiety  - Mood is improving, sleep is improving  - Involved in counseling    Syphilis  - Recent RPR elevated above baseline. Has been sexually active with 1 partner. Asymptomatic     Review of Systems  10 point review of systems negative except as noted in HPI.    Objective     There were no vitals taken for this visit.  Gen: Well appearing, no acute distress  HEENT: Mucous membranes moist  Pulm: Respirations nonlabored  Psych: Normal mood and affect      Assessment/Plan   30 yo F here for follow-up    Depression and anxiety  -Continue lexapro, wellbutrin, hydroxyzine   - Continue counseling    Diarrhea  - Initially thought to be related to IBS however symptoms worsening so will refer to GI    HIV  - Well controlled, continue current regimen    Syphilis  - Bicillin x 1, patient to inform partner so partner can be treated as well  - Encouraged checking Gc/Chl as well    RTC 2 months or sooner as needed

## 2024-11-26 ENCOUNTER — CLINICAL SUPPORT (OUTPATIENT)
Dept: IMMUNOLOGY | Facility: CLINIC | Age: 29
End: 2024-11-26
Payer: MEDICARE

## 2024-11-26 DIAGNOSIS — B37.9 YEAST INFECTION: ICD-10-CM

## 2024-11-26 DIAGNOSIS — A53.9 SYPHILIS: ICD-10-CM

## 2024-11-26 PROCEDURE — 2500000004 HC RX 250 GENERAL PHARMACY W/ HCPCS (ALT 636 FOR OP/ED): Mod: JZ | Performed by: FAMILY MEDICINE

## 2024-11-26 PROCEDURE — 96372 THER/PROPH/DIAG INJ SC/IM: CPT | Performed by: FAMILY MEDICINE

## 2024-11-27 RX ORDER — FLUCONAZOLE 150 MG/1
150 TABLET ORAL ONCE
Qty: 1 TABLET | Refills: 0 | Status: SHIPPED | OUTPATIENT
Start: 2024-11-27 | End: 2024-11-27

## 2024-12-03 ENCOUNTER — TELEPHONE (OUTPATIENT)
Dept: IMMUNOLOGY | Facility: CLINIC | Age: 29
End: 2024-12-03
Payer: MEDICARE

## 2024-12-03 NOTE — TELEPHONE ENCOUNTER
From: Pau Banuelos  Sent: Tuesday, December 3, 2024 9:48 AM  To: [email or web address] <[email or web address]>  Subject: Income     Hi Yolis,    I hope you had a nice holiday. I'm writing to remind you to please send me your pay stubs ASAP so that I can update your OHDAP - your application  on . Please let me know if you updated your Marketplace application and either chose a new plan, or kept your plan for next year.     Also, I wanted to remind you of our [facility] holiday party this Friday, , from 2pm-4pm. There will be food, and you're welcome to come and socialize and spend time with other patients and staff if you'd like. Just let me know and I'll RSVP for you. The party is just up the street at the [facility].     Take care, and I'll talk to you soon.      Pau “MJ” ANNMARIE Banuelos  Pronouns: they/them (what is this?)   [facility]  P: [phone]  F: [phone]  [email or web address]

## 2024-12-09 DIAGNOSIS — Z30.41 ENCOUNTER FOR SURVEILLANCE OF CONTRACEPTIVE PILLS: ICD-10-CM

## 2024-12-09 RX ORDER — DROSPIRENONE 4 MG/1
1 TABLET, FILM COATED ORAL DAILY
Qty: 28 TABLET | Refills: 12 | OUTPATIENT
Start: 2024-12-09

## 2024-12-09 NOTE — PROGRESS NOTES
Pt requesting refill on BC to [facility] in [place].  Yearly exam scheduled for 2/2025.

## 2024-12-10 RX ORDER — DROSPIRENONE 4 MG/1
1 TABLET, FILM COATED ORAL DAILY
Qty: 28 TABLET | Refills: 3 | Status: SHIPPED | OUTPATIENT
Start: 2024-12-10 | End: 2025-12-10

## 2024-12-31 DIAGNOSIS — G43.509 PERSISTENT MIGRAINE AURA WITHOUT CEREBRAL INFARCTION AND WITHOUT STATUS MIGRAINOSUS, NOT INTRACTABLE: ICD-10-CM

## 2025-01-02 RX ORDER — BUTALBITAL, ACETAMINOPHEN AND CAFFEINE 50; 325; 40 MG/1; MG/1; MG/1
1-2 TABLET ORAL EVERY 4 HOURS PRN
Qty: 30 TABLET | Refills: 0 | Status: SHIPPED | OUTPATIENT
Start: 2025-01-02 | End: 2025-08-30

## 2025-01-03 DIAGNOSIS — Z79.899 HIGH RISK MEDICATION USE: ICD-10-CM

## 2025-01-03 RX ORDER — BICTEGRAVIR SODIUM, EMTRICITABINE, AND TENOFOVIR ALAFENAMIDE FUMARATE 50; 200; 25 MG/1; MG/1; MG/1
1 TABLET ORAL DAILY
Qty: 30 TABLET | Refills: 0 | Status: SHIPPED | OUTPATIENT
Start: 2025-01-03

## 2025-01-29 DIAGNOSIS — B20 CURRENTLY ASYMPTOMATIC HIV INFECTION, WITH HISTORY OF HIV-RELATED ILLNESS (MULTI): ICD-10-CM

## 2025-01-29 DIAGNOSIS — Z30.41 ENCOUNTER FOR SURVEILLANCE OF CONTRACEPTIVE PILLS: Primary | ICD-10-CM

## 2025-01-29 RX ORDER — DESOGESTREL AND ETHINYL ESTRADIOL 0.15-0.03
1 KIT ORAL DAILY
Qty: 28 TABLET | Refills: 12 | Status: SHIPPED | OUTPATIENT
Start: 2025-01-29 | End: 2026-01-29

## 2025-01-29 NOTE — PROGRESS NOTES
[facility]

## 2025-01-30 ENCOUNTER — DOCUMENTATION (OUTPATIENT)
Dept: IMMUNOLOGY | Facility: CLINIC | Age: 30
End: 2025-01-30
Payer: MEDICARE

## 2025-01-30 DIAGNOSIS — Z30.41 ENCOUNTER FOR SURVEILLANCE OF CONTRACEPTIVE PILLS: ICD-10-CM

## 2025-01-30 RX ORDER — DROSPIRENONE 4 MG/1
1 TABLET, FILM COATED ORAL DAILY
Qty: 28 TABLET | Refills: 3 | Status: SHIPPED | OUTPATIENT
Start: 2025-01-30 | End: 2025-05-22

## 2025-01-30 NOTE — PROGRESS NOTES
Received message from patient yesterday afternoon after leaving her a message stating  that we changed her BCP to Apri from Slynd as her insurance will not cover Slynd and no reason patient cannot take Apri.  Patient would like to continue on Slynd and pay for it with a  copay card.  RN to notify MD of patient's request.

## 2025-01-31 RX ORDER — BICTEGRAVIR SODIUM, EMTRICITABINE, AND TENOFOVIR ALAFENAMIDE FUMARATE 50; 200; 25 MG/1; MG/1; MG/1
1 TABLET ORAL DAILY
Qty: 30 TABLET | Refills: 0 | Status: SHIPPED | OUTPATIENT
Start: 2025-01-31

## 2025-02-28 ENCOUNTER — APPOINTMENT (OUTPATIENT)
Dept: IMMUNOLOGY | Facility: CLINIC | Age: 30
End: 2025-02-28
Payer: MEDICARE

## 2025-03-12 DIAGNOSIS — B20 CURRENTLY ASYMPTOMATIC HIV INFECTION, WITH HISTORY OF HIV-RELATED ILLNESS (MULTI): ICD-10-CM

## 2025-03-12 RX ORDER — BICTEGRAVIR SODIUM, EMTRICITABINE, AND TENOFOVIR ALAFENAMIDE FUMARATE 50; 200; 25 MG/1; MG/1; MG/1
1 TABLET ORAL DAILY
Qty: 30 TABLET | Refills: 0 | Status: SHIPPED | OUTPATIENT
Start: 2025-03-12

## 2025-04-04 ENCOUNTER — CLINICAL SUPPORT (OUTPATIENT)
Dept: IMMUNOLOGY | Facility: CLINIC | Age: 30
End: 2025-04-04
Payer: COMMERCIAL

## 2025-04-04 DIAGNOSIS — B20 CURRENTLY ASYMPTOMATIC HIV INFECTION, WITH HISTORY OF HIV-RELATED ILLNESS (MULTI): ICD-10-CM

## 2025-04-04 LAB
ALBUMIN SERPL BCP-MCNC: 4.3 G/DL (ref 3.4–5)
ALP SERPL-CCNC: 71 U/L (ref 33–110)
ALT SERPL W P-5'-P-CCNC: 12 U/L (ref 7–45)
ANION GAP SERPL CALC-SCNC: 14 MMOL/L (ref 10–20)
AST SERPL W P-5'-P-CCNC: 15 U/L (ref 9–39)
BASOPHILS # BLD AUTO: 0.06 X10*3/UL (ref 0–0.1)
BASOPHILS NFR BLD AUTO: 1 %
BILIRUB SERPL-MCNC: 0.3 MG/DL (ref 0–1.2)
BUN SERPL-MCNC: 20 MG/DL (ref 6–23)
CALCIUM SERPL-MCNC: 9.3 MG/DL (ref 8.6–10.6)
CHLORIDE SERPL-SCNC: 104 MMOL/L (ref 98–107)
CO2 SERPL-SCNC: 25 MMOL/L (ref 21–32)
CREAT SERPL-MCNC: 0.96 MG/DL (ref 0.5–1.05)
EGFRCR SERPLBLD CKD-EPI 2021: 82 ML/MIN/1.73M*2
EOSINOPHIL # BLD AUTO: 0.11 X10*3/UL (ref 0–0.7)
EOSINOPHIL NFR BLD AUTO: 1.8 %
ERYTHROCYTE [DISTWIDTH] IN BLOOD BY AUTOMATED COUNT: 13.8 % (ref 11.5–14.5)
GLUCOSE SERPL-MCNC: 109 MG/DL (ref 74–99)
HCT VFR BLD AUTO: 38.7 % (ref 36–46)
HGB BLD-MCNC: 12.3 G/DL (ref 12–16)
IMM GRANULOCYTES # BLD AUTO: 0.01 X10*3/UL (ref 0–0.7)
IMM GRANULOCYTES NFR BLD AUTO: 0.2 % (ref 0–0.9)
LYMPHOCYTES # BLD AUTO: 3.02 X10*3/UL (ref 1.2–4.8)
LYMPHOCYTES NFR BLD AUTO: 48.6 %
MCH RBC QN AUTO: 27.3 PG (ref 26–34)
MCHC RBC AUTO-ENTMCNC: 31.8 G/DL (ref 32–36)
MCV RBC AUTO: 86 FL (ref 80–100)
MONOCYTES # BLD AUTO: 0.78 X10*3/UL (ref 0.1–1)
MONOCYTES NFR BLD AUTO: 12.5 %
NEUTROPHILS # BLD AUTO: 2.24 X10*3/UL (ref 1.2–7.7)
NEUTROPHILS NFR BLD AUTO: 35.9 %
NRBC BLD-RTO: 0 /100 WBCS (ref 0–0)
PLATELET # BLD AUTO: 250 X10*3/UL (ref 150–450)
POTASSIUM SERPL-SCNC: 4.5 MMOL/L (ref 3.5–5.3)
PROT SERPL-MCNC: 6.9 G/DL (ref 6.4–8.2)
RBC # BLD AUTO: 4.5 X10*6/UL (ref 4–5.2)
SODIUM SERPL-SCNC: 138 MMOL/L (ref 136–145)
WBC # BLD AUTO: 6.2 X10*3/UL (ref 4.4–11.3)

## 2025-04-04 PROCEDURE — 87536 HIV-1 QUANT&REVRSE TRNSCRPJ: CPT

## 2025-04-04 PROCEDURE — 36415 COLL VENOUS BLD VENIPUNCTURE: CPT

## 2025-04-04 PROCEDURE — 85025 COMPLETE CBC W/AUTO DIFF WBC: CPT

## 2025-04-04 PROCEDURE — 86318 IA INFECTIOUS AGENT ANTIBODY: CPT

## 2025-04-04 PROCEDURE — 84075 ASSAY ALKALINE PHOSPHATASE: CPT

## 2025-04-04 NOTE — LETTER
04/04/25    Samara Miranda MD MPH  [address]  Department Of Medicine-General Internal  [facility] [address]      Dear Dr. Samara Miranda MD MPH,    I am writing to confirm that your patient, Yolis Castillo, received care in my office on 04/04/25. I have enclosed a summary of the care provided to Yolis for your reference.    Please contact me with any questions you may have regarding the visit.    Sincerely,         [place] [address] [address]  [address] [address]  [phone]    CC: No Recipients

## 2025-04-05 LAB
RPR SER QL: REACTIVE
RPR SER-TITR: ABNORMAL {TITER}

## 2025-04-06 LAB
CD3+CD4+ CELLS # BLD: 0.72 X10E9/L
CD3+CD4+ CELLS # BLD: 725 /MM3
CD3+CD4+ CELLS NFR BLD: 24 %
CD3+CD4+ CELLS/CD3+CD8+ CLL BLD: 0.5 %
CD3+CD8+ CELLS # BLD: 1.45 X10E9/L
CD3+CD8+ CELLS NFR BLD: 48 %
HIV1 RNA # PLAS NAA DL=20: NOT DETECTED {COPIES}/ML
HIV1 RNA SPEC NAA+PROBE-LOG#: NORMAL {LOG_COPIES}/ML
LYMPHOCYTES # SPEC AUTO: 3.02 X10*3/UL

## 2025-04-10 DIAGNOSIS — B20 CURRENTLY ASYMPTOMATIC HIV INFECTION, WITH HISTORY OF HIV-RELATED ILLNESS (MULTI): ICD-10-CM

## 2025-04-10 RX ORDER — BICTEGRAVIR SODIUM, EMTRICITABINE, AND TENOFOVIR ALAFENAMIDE FUMARATE 50; 200; 25 MG/1; MG/1; MG/1
1 TABLET ORAL DAILY
Qty: 30 TABLET | Refills: 3 | Status: SHIPPED | OUTPATIENT
Start: 2025-04-10

## 2025-04-11 NOTE — TELEPHONE ENCOUNTER
Called patient about the need for refills of Slynd.  Request came from [facility].  Patient said she still has refills.  Has not seen Dr. Finn since 2023.  Scheduled patient to be seen May 28 at 10AM.

## 2025-05-28 ENCOUNTER — OFFICE VISIT (OUTPATIENT)
Dept: OBSTETRICS AND GYNECOLOGY | Facility: CLINIC | Age: 30
End: 2025-05-28
Payer: COMMERCIAL

## 2025-05-28 VITALS
DIASTOLIC BLOOD PRESSURE: 78 MMHG | SYSTOLIC BLOOD PRESSURE: 113 MMHG | BODY MASS INDEX: 36.04 KG/M2 | HEART RATE: 103 BPM | WEIGHT: 237 LBS | OXYGEN SATURATION: 97 % | RESPIRATION RATE: 21 BRPM

## 2025-05-28 DIAGNOSIS — B37.9 YEAST INFECTION: ICD-10-CM

## 2025-05-28 DIAGNOSIS — Z11.3 SCREEN FOR STD (SEXUALLY TRANSMITTED DISEASE): ICD-10-CM

## 2025-05-28 DIAGNOSIS — Z30.41 ENCOUNTER FOR SURVEILLANCE OF CONTRACEPTIVE PILLS: ICD-10-CM

## 2025-05-28 DIAGNOSIS — Z01.419 WELL WOMAN EXAM WITH ROUTINE GYNECOLOGICAL EXAM: Primary | ICD-10-CM

## 2025-05-28 PROCEDURE — 99395 PREV VISIT EST AGE 18-39: CPT | Performed by: OBSTETRICS & GYNECOLOGY

## 2025-05-28 PROCEDURE — 87591 N.GONORRHOEAE DNA AMP PROB: CPT | Performed by: OBSTETRICS & GYNECOLOGY

## 2025-05-28 PROCEDURE — 1036F TOBACCO NON-USER: CPT | Performed by: OBSTETRICS & GYNECOLOGY

## 2025-05-28 PROCEDURE — 87661 TRICHOMONAS VAGINALIS AMPLIF: CPT | Performed by: OBSTETRICS & GYNECOLOGY

## 2025-05-28 PROCEDURE — 87205 SMEAR GRAM STAIN: CPT | Performed by: OBSTETRICS & GYNECOLOGY

## 2025-05-28 PROCEDURE — 87491 CHLMYD TRACH DNA AMP PROBE: CPT | Performed by: OBSTETRICS & GYNECOLOGY

## 2025-05-28 RX ORDER — FLUCONAZOLE 150 MG/1
150 TABLET ORAL ONCE
Qty: 1 TABLET | Refills: 3 | Status: SHIPPED | OUTPATIENT
Start: 2025-05-28 | End: 2025-05-28

## 2025-05-28 RX ORDER — DROSPIRENONE 4 MG/1
1 TABLET, FILM COATED ORAL DAILY
Qty: 28 TABLET | Refills: 12 | Status: SHIPPED | OUTPATIENT
Start: 2025-05-28 | End: 2026-05-28

## 2025-05-28 ASSESSMENT — PAIN SCALES - GENERAL: PAINLEVEL_OUTOF10: 0-NO PAIN

## 2025-05-28 NOTE — PROGRESS NOTES
"Subjective   Patient ID: Yolis Castillo is a 30 y.o. female who presents for No chief complaint on file..  30 year old patient for well woman exam.   Pap WNL x2 in 2022 and 2023. Next Pap 2026.   Hx CT- has not had repeat testing since she was treated in 2023.   Contraception: Slynd  Using condoms- but notices a \"yeast infection\" after condom use. Discharge is clumpy, white, itching. No odor.       Review of Systems   All other systems reviewed and are negative.    Objective   Physical Exam  Constitutional:       Appearance: Normal appearance.   HENT:      Head: Normocephalic and atraumatic.      Right Ear: External ear normal.      Left Ear: External ear normal.      Nose: Nose normal.      Mouth/Throat:      Mouth: Mucous membranes are moist.   Eyes:      Extraocular Movements: Extraocular movements intact.   Neck:      Thyroid: No thyroid mass or thyromegaly.   Cardiovascular:      Rate and Rhythm: Normal rate and regular rhythm.      Heart sounds: Normal heart sounds.   Pulmonary:      Effort: Pulmonary effort is normal.      Breath sounds: Normal breath sounds.   Chest:   Breasts:     Right: Normal.      Left: Normal.   Abdominal:      General: Abdomen is flat. Bowel sounds are normal.      Palpations: Abdomen is soft.      Tenderness: There is no abdominal tenderness. There is no right CVA tenderness or left CVA tenderness.   Genitourinary:     General: Normal vulva.      Exam position: Lithotomy position.      Labia:         Right: No lesion.         Left: No lesion.       Urethra: No prolapse, urethral swelling or urethral lesion.      Vagina: Vaginal discharge present.      Cervix: Normal.      Uterus: Normal.       Adnexa: Right adnexa normal and left adnexa normal.   Musculoskeletal:         General: Normal range of motion.      Right shoulder: Normal.      Left shoulder: Normal.      Cervical back: Normal, normal range of motion and neck supple.      Thoracic back: Normal.      Lumbar back: Normal.     "  Right hip: Normal.      Left hip: Normal.      Right upper leg: Normal.      Left upper leg: Normal.      Right knee: Normal.      Left knee: Normal.      Right lower leg: Normal.      Left lower leg: Normal.   Lymphadenopathy:      Upper Body:      Right upper body: No axillary adenopathy.      Left upper body: No axillary adenopathy.      Lower Body: No right inguinal adenopathy. No left inguinal adenopathy.   Skin:     General: Skin is warm and dry.   Neurological:      General: No focal deficit present.      Mental Status: She is alert and oriented to person, place, and time.      Cranial Nerves: Cranial nerves 2-12 are intact.      Motor: Motor function is intact.   Psychiatric:         Attention and Perception: Attention and perception normal.         Mood and Affect: Mood and affect normal.         Behavior: Behavior normal.         Cognition and Memory: Cognition normal.         Judgment: Judgment normal.         Assessment/Plan   Problem List Items Addressed This Visit    None  Visit Diagnoses         Codes      Well woman exam with routine gynecological exam    -  Primary Z01.419      Screen for STD (sexually transmitted disease)     Z11.3    Relevant Orders    Vaginitis Gram Stain For Bacterial Vaginosis + Yeast    C. trachomatis / N. gonorrhoeae, Amplified, Urogenital    Trichomonas vaginalis, Amplified      Encounter for surveillance of contraceptive pills     Z30.41    Relevant Medications    Slynd 4 mg (28) tablet      Yeast infection     B37.9    Relevant Medications    fluconazole (Diflucan) 150 mg tablet               Pattie Finn MD 05/28/25 9:18 AM

## 2025-05-29 ENCOUNTER — DOCUMENTATION (OUTPATIENT)
Dept: IMMUNOLOGY | Facility: CLINIC | Age: 30
End: 2025-05-29
Payer: COMMERCIAL

## 2025-05-29 DIAGNOSIS — N76.0 BV (BACTERIAL VAGINOSIS): Primary | ICD-10-CM

## 2025-05-29 DIAGNOSIS — B96.89 BV (BACTERIAL VAGINOSIS): Primary | ICD-10-CM

## 2025-05-29 LAB
C TRACH RRNA SPEC QL NAA+PROBE: NEGATIVE
CLUE CELLS VAG LPF-#/AREA: PRESENT /[LPF]
N GONORRHOEA DNA SPEC QL PROBE+SIG AMP: NEGATIVE
NUGENT SCORE: 10 (ref ?–6)
T VAGINALIS RRNA SPEC QL NAA+PROBE: NEGATIVE
YEAST VAG WET PREP-#/AREA: ABNORMAL

## 2025-05-29 RX ORDER — METRONIDAZOLE 10 MG/G
GEL TOPICAL DAILY
Qty: 60 G | Refills: 0 | Status: CANCELLED | OUTPATIENT
Start: 2025-05-29 | End: 2025-06-05

## 2025-05-29 RX ORDER — METRONIDAZOLE 500 MG/1
500 TABLET ORAL 2 TIMES DAILY
Qty: 14 TABLET | Refills: 0 | Status: SHIPPED | OUTPATIENT
Start: 2025-05-29 | End: 2025-05-30 | Stop reason: ENTERED-IN-ERROR

## 2025-05-29 NOTE — PROGRESS NOTES
Dr. Hardy contacted this RN.  Patient with BV, not yeast.  Needs Metrogel or pills.  Patient chose pills.

## 2025-05-30 DIAGNOSIS — B96.89 BV (BACTERIAL VAGINOSIS): Primary | ICD-10-CM

## 2025-05-30 DIAGNOSIS — N76.0 BV (BACTERIAL VAGINOSIS): Primary | ICD-10-CM

## 2025-05-30 RX ORDER — METRONIDAZOLE VAGINAL GEL, 1.3 % 65 MG/5G
1 GEL VAGINAL NIGHTLY
Qty: 7 G | Refills: 0 | Status: CANCELLED | OUTPATIENT
Start: 2025-05-30

## 2025-05-30 RX ORDER — METRONIDAZOLE 7.5 MG/G
GEL VAGINAL DAILY
Qty: 70 G | Refills: 0 | Status: SHIPPED | OUTPATIENT
Start: 2025-05-30 | End: 2025-06-04

## 2025-05-30 NOTE — PROGRESS NOTES
Patient no longer uses [facility] due to insurance.  Needs script for Metrogel to be sent to [facility].

## 2025-06-03 ENCOUNTER — TELEPHONE (OUTPATIENT)
Dept: IMMUNOLOGY | Facility: CLINIC | Age: 30
End: 2025-06-03
Payer: COMMERCIAL

## 2025-06-03 NOTE — TELEPHONE ENCOUNTER
Team received med cert for [employer]. Kasey calls Pt to check in. Pt reports she is doing well - working new job based out of [place], getting caught up with things now. Sw confirms Pt's address - residence and mailing address differ, med cert is in Pt's name, mailing address in chart is her mother's address. Pt reports that her new employer may offer insurance if she is offered a contract past her probationary period. Kasey confirmed that [facility] is a network provider for [employer] - Pt can make certain by checking directly with [employer] before enrolling. Pt scheduled to see Dr. Lindquist on 7/11 - will keep Kasey updated at appt or sooner if any information known ahead of time. Pt will continue to contact Kasey if any issues or barriers come up. Sw will continue to follow PRN.

## 2025-07-11 ENCOUNTER — OFFICE VISIT (OUTPATIENT)
Dept: IMMUNOLOGY | Facility: CLINIC | Age: 30
End: 2025-07-11
Payer: COMMERCIAL

## 2025-07-11 VITALS
HEART RATE: 112 BPM | SYSTOLIC BLOOD PRESSURE: 114 MMHG | TEMPERATURE: 98.1 F | OXYGEN SATURATION: 100 % | HEIGHT: 68 IN | DIASTOLIC BLOOD PRESSURE: 89 MMHG | WEIGHT: 245 LBS | BODY MASS INDEX: 37.13 KG/M2 | RESPIRATION RATE: 16 BRPM

## 2025-07-11 DIAGNOSIS — F41.9 ANXIETY: ICD-10-CM

## 2025-07-11 DIAGNOSIS — R63.5 WEIGHT GAIN: ICD-10-CM

## 2025-07-11 DIAGNOSIS — B20 CURRENTLY ASYMPTOMATIC HIV INFECTION, WITH HISTORY OF HIV-RELATED ILLNESS (MULTI): Primary | ICD-10-CM

## 2025-07-11 DIAGNOSIS — A53.9 SYPHILIS: ICD-10-CM

## 2025-07-11 DIAGNOSIS — F33.9 RECURRENT MAJOR DEPRESSIVE DISORDER, REMISSION STATUS UNSPECIFIED: ICD-10-CM

## 2025-07-11 LAB
ALBUMIN SERPL BCP-MCNC: 4.6 G/DL (ref 3.4–5)
ALP SERPL-CCNC: 70 U/L (ref 33–110)
ALT SERPL W P-5'-P-CCNC: 15 U/L (ref 7–45)
ANION GAP SERPL CALC-SCNC: 13 MMOL/L (ref 10–20)
AST SERPL W P-5'-P-CCNC: 15 U/L (ref 9–39)
BASOPHILS # BLD AUTO: 0.04 X10*3/UL (ref 0–0.1)
BASOPHILS NFR BLD AUTO: 0.7 %
BILIRUB SERPL-MCNC: 0.4 MG/DL (ref 0–1.2)
BUN SERPL-MCNC: 15 MG/DL (ref 6–23)
CALCIUM SERPL-MCNC: 9.5 MG/DL (ref 8.6–10.6)
CHLORIDE SERPL-SCNC: 104 MMOL/L (ref 98–107)
CHOLEST SERPL-MCNC: 209 MG/DL (ref 0–199)
CHOLESTEROL/HDL RATIO: 2.9
CO2 SERPL-SCNC: 26 MMOL/L (ref 21–32)
CREAT SERPL-MCNC: 0.78 MG/DL (ref 0.5–1.05)
EGFRCR SERPLBLD CKD-EPI 2021: >90 ML/MIN/1.73M*2
EOSINOPHIL # BLD AUTO: 0.2 X10*3/UL (ref 0–0.7)
EOSINOPHIL NFR BLD AUTO: 3.4 %
ERYTHROCYTE [DISTWIDTH] IN BLOOD BY AUTOMATED COUNT: 14.2 % (ref 11.5–14.5)
EST. AVERAGE GLUCOSE BLD GHB EST-MCNC: 114 MG/DL
GLUCOSE SERPL-MCNC: 109 MG/DL (ref 74–99)
HBA1C MFR BLD: 5.6 % (ref ?–5.7)
HCT VFR BLD AUTO: 42.5 % (ref 36–46)
HDLC SERPL-MCNC: 72.6 MG/DL
HGB BLD-MCNC: 12.8 G/DL (ref 12–16)
IMM GRANULOCYTES # BLD AUTO: 0.02 X10*3/UL (ref 0–0.7)
IMM GRANULOCYTES NFR BLD AUTO: 0.3 % (ref 0–0.9)
LDLC SERPL CALC-MCNC: 124 MG/DL
LYMPHOCYTES # BLD AUTO: 2.44 X10*3/UL (ref 1.2–4.8)
LYMPHOCYTES NFR BLD AUTO: 41.7 %
MCH RBC QN AUTO: 26.7 PG (ref 26–34)
MCHC RBC AUTO-ENTMCNC: 30.1 G/DL (ref 32–36)
MCV RBC AUTO: 89 FL (ref 80–100)
MONOCYTES # BLD AUTO: 0.8 X10*3/UL (ref 0.1–1)
MONOCYTES NFR BLD AUTO: 13.7 %
NEUTROPHILS # BLD AUTO: 2.35 X10*3/UL (ref 1.2–7.7)
NEUTROPHILS NFR BLD AUTO: 40.2 %
NON HDL CHOLESTEROL: 136 MG/DL (ref 0–149)
NRBC BLD-RTO: 0 /100 WBCS (ref 0–0)
PLATELET # BLD AUTO: 254 X10*3/UL (ref 150–450)
POTASSIUM SERPL-SCNC: 4 MMOL/L (ref 3.5–5.3)
PROT SERPL-MCNC: 7.1 G/DL (ref 6.4–8.2)
RBC # BLD AUTO: 4.8 X10*6/UL (ref 4–5.2)
SODIUM SERPL-SCNC: 139 MMOL/L (ref 136–145)
TRIGL SERPL-MCNC: 64 MG/DL (ref 0–149)
VLDL: 13 MG/DL (ref 0–40)
WBC # BLD AUTO: 5.9 X10*3/UL (ref 4.4–11.3)

## 2025-07-11 PROCEDURE — 36415 COLL VENOUS BLD VENIPUNCTURE: CPT | Performed by: FAMILY MEDICINE

## 2025-07-11 PROCEDURE — 85025 COMPLETE CBC W/AUTO DIFF WBC: CPT | Performed by: FAMILY MEDICINE

## 2025-07-11 PROCEDURE — 83036 HEMOGLOBIN GLYCOSYLATED A1C: CPT | Performed by: FAMILY MEDICINE

## 2025-07-11 PROCEDURE — 87536 HIV-1 QUANT&REVRSE TRNSCRPJ: CPT | Performed by: FAMILY MEDICINE

## 2025-07-11 PROCEDURE — 3008F BODY MASS INDEX DOCD: CPT | Performed by: FAMILY MEDICINE

## 2025-07-11 PROCEDURE — 80053 COMPREHEN METABOLIC PANEL: CPT | Performed by: FAMILY MEDICINE

## 2025-07-11 PROCEDURE — 86318 IA INFECTIOUS AGENT ANTIBODY: CPT | Performed by: FAMILY MEDICINE

## 2025-07-11 PROCEDURE — 99214 OFFICE O/P EST MOD 30 MIN: CPT | Performed by: FAMILY MEDICINE

## 2025-07-11 PROCEDURE — 80061 LIPID PANEL: CPT | Performed by: FAMILY MEDICINE

## 2025-07-11 ASSESSMENT — PAIN SCALES - GENERAL: PAINLEVEL_OUTOF10: 0-NO PAIN

## 2025-07-11 NOTE — PROGRESS NOTES
"Subjective   Patient ID: Yolis Castillo is a 30 y.o. who presents for follow-up    HPI    HIV- well controlled, taking Biktarvy  - Doing well, no missed doses  - Labs stable recently    Weight gain  - Started in November. Has noticed she is extra hungry  - Reports that diet is great  - Walks dog for exercise  - Not interested in GLP1 agonist     Depression and anxiety  - Mood is improving, sleep is improving  - Not seeing counselor currently   - Stopped lexapro, wellbutrin didn't like how it made her feeling     Review of Systems  10 point review of systems negative except as noted in HPI.    Objective     /89 (BP Location: Left arm, Patient Position: Sitting, BP Cuff Size: Adult long)   Pulse (!) 112   Temp 36.7 °C (98.1 °F) (Skin)   Resp 16   Ht 1.727 m (5' 8\")   Wt 111 kg (245 lb)   SpO2 100%   BMI 37.25 kg/m²   General: well appearing, no distress  Neck: No lymphadenopathy, no thyromegaly  CV: Regular rate and rhythm, no murmur  Lungs: Clear to auscultation bilaterally  Abdomen: Soft, nontender, nondistended  Extremities: No edema noted  Psych: Appropriate mood and affect        Assessment/Plan   31 yo F here for follow-up    HIV  - continue Biktarvy, labs as ordered    Weight gain  - Obtain lipids and A1C  - Meet with nutritionist     Depression and anxiety  -Well controlled, off medication  - Consideration for resuming wellbutrin in the future     H/o syphilis  - Obtain RPR for monitoring    RTC 2 months or sooner as needed   " left

## 2025-07-11 NOTE — LETTER
07/12/25    Samara Miranda MD MPH  [id number]  Department Of Medicine-General Internal  [facility] [address]      Dear Dr. Samara Miranda MD MPH,    I am writing to confirm that your patient, Yolis Castillo, received care in my office on 07/12/25. I have enclosed a summary of the care provided to Yolis for your reference.    Please contact me with any questions you may have regarding the visit.    Sincerely,         Jess Lindquist MD  [address] [facility] [address]  [facility] [address]  [phone]    CC: No Recipients

## 2025-07-13 LAB
HIV1 RNA # PLAS NAA DL=20: 25 COPIES/ML (ref ?–20)
HIV1 RNA # PLAS NAA DL=20: DETECTED {COPIES}/ML
HIV1 RNA SPEC NAA+PROBE-LOG#: 1.4 LOG10 CPY/ML
RPR SER QL: REACTIVE
RPR SER-TITR: ABNORMAL {TITER}

## 2025-07-15 LAB
CD3+CD4+ CELLS # BLD: 0.49 X10E9/L (ref 0.35–2.74)
CD3+CD4+ CELLS # BLD: 488 /MM3 (ref 350–2740)
CD3+CD4+ CELLS NFR BLD: 20 % (ref 29–57)
CD3+CD4+ CELLS/CD3+CD8+ CLL BLD: 0.43 % (ref 1–3.5)
CD3+CD8+ CELLS # BLD: 1.12 X10E9/L (ref 0.08–1.49)
CD3+CD8+ CELLS NFR BLD: 46 % (ref 7–31)
LYMPHOCYTES # SPEC AUTO: 2.44 X10*3/UL

## 2025-08-06 DIAGNOSIS — N76.0 BACTERIAL VAGINOSIS: ICD-10-CM

## 2025-08-06 DIAGNOSIS — B96.89 BACTERIAL VAGINOSIS: ICD-10-CM

## 2025-08-06 RX ORDER — METRONIDAZOLE 7.5 MG/G
GEL VAGINAL DAILY
Qty: 70 G | Refills: 0 | Status: SHIPPED | OUTPATIENT
Start: 2025-08-06 | End: 2025-08-11

## 2025-09-05 ENCOUNTER — APPOINTMENT (OUTPATIENT)
Dept: IMMUNOLOGY | Facility: CLINIC | Age: 30
End: 2025-09-05
Payer: COMMERCIAL